# Patient Record
Sex: FEMALE | Race: WHITE | NOT HISPANIC OR LATINO | URBAN - METROPOLITAN AREA
[De-identification: names, ages, dates, MRNs, and addresses within clinical notes are randomized per-mention and may not be internally consistent; named-entity substitution may affect disease eponyms.]

---

## 2018-09-03 ENCOUNTER — INPATIENT (INPATIENT)
Facility: HOSPITAL | Age: 73
LOS: 3 days | Discharge: NURSING, REHAB OR LONG-TERM CARE | DRG: 177 | End: 2018-09-07
Attending: INTERNAL MEDICINE | Admitting: INTERNAL MEDICINE
Payer: MEDICARE

## 2018-09-03 VITALS
HEIGHT: 66 IN | DIASTOLIC BLOOD PRESSURE: 107 MMHG | SYSTOLIC BLOOD PRESSURE: 148 MMHG | WEIGHT: 279.99 LBS | RESPIRATION RATE: 30 BRPM | HEART RATE: 91 BPM | OXYGEN SATURATION: 99 %

## 2018-09-03 DIAGNOSIS — R09.89 OTHER SPECIFIED SYMPTOMS AND SIGNS INVOLVING THE CIRCULATORY AND RESPIRATORY SYSTEMS: ICD-10-CM

## 2018-09-03 DIAGNOSIS — R06.03 ACUTE RESPIRATORY DISTRESS: ICD-10-CM

## 2018-09-03 DIAGNOSIS — J44.1 CHRONIC OBSTRUCTIVE PULMONARY DISEASE WITH (ACUTE) EXACERBATION: ICD-10-CM

## 2018-09-03 PROBLEM — Z00.00 ENCOUNTER FOR PREVENTIVE HEALTH EXAMINATION: Status: ACTIVE | Noted: 2018-09-03

## 2018-09-03 LAB
ALBUMIN SERPL ELPH-MCNC: 3.6 G/DL — SIGNIFICANT CHANGE UP (ref 3.3–5)
ALP SERPL-CCNC: 123 U/L — HIGH (ref 40–120)
ALT FLD-CCNC: 22 U/L — SIGNIFICANT CHANGE UP (ref 12–78)
ANION GAP SERPL CALC-SCNC: 5 MMOL/L — SIGNIFICANT CHANGE UP (ref 5–17)
APTT BLD: 24.9 SEC — LOW (ref 27.5–37.4)
AST SERPL-CCNC: 38 U/L — HIGH (ref 15–37)
BASE EXCESS BLDA CALC-SCNC: 11.2 MMOL/L — HIGH (ref -2–2)
BASE EXCESS BLDA CALC-SCNC: 9.3 MMOL/L — HIGH (ref -2–2)
BASOPHILS # BLD AUTO: 0 K/UL — SIGNIFICANT CHANGE UP (ref 0–0.2)
BASOPHILS NFR BLD AUTO: 0 % — SIGNIFICANT CHANGE UP (ref 0–2)
BILIRUB SERPL-MCNC: 1.1 MG/DL — SIGNIFICANT CHANGE UP (ref 0.2–1.2)
BLOOD GAS COMMENTS ARTERIAL: SIGNIFICANT CHANGE UP
BLOOD GAS COMMENTS ARTERIAL: SIGNIFICANT CHANGE UP
BUN SERPL-MCNC: 14 MG/DL — SIGNIFICANT CHANGE UP (ref 7–23)
CALCIUM SERPL-MCNC: 9.6 MG/DL — SIGNIFICANT CHANGE UP (ref 8.5–10.1)
CHLORIDE SERPL-SCNC: 94 MMOL/L — LOW (ref 96–108)
CK MB BLD-MCNC: 9 % — HIGH (ref 0–3.5)
CK MB CFR SERPL CALC: 9.3 NG/ML — HIGH (ref 0–3.6)
CK SERPL-CCNC: 103 U/L — SIGNIFICANT CHANGE UP (ref 26–192)
CO2 SERPL-SCNC: 37 MMOL/L — HIGH (ref 22–31)
CREAT SERPL-MCNC: 0.81 MG/DL — SIGNIFICANT CHANGE UP (ref 0.5–1.3)
DIGOXIN SERPL-MCNC: 0.7 NG/ML — LOW (ref 0.8–2)
EOSINOPHIL # BLD AUTO: 0 K/UL — SIGNIFICANT CHANGE UP (ref 0–0.5)
EOSINOPHIL NFR BLD AUTO: 0 % — SIGNIFICANT CHANGE UP (ref 0–6)
GLUCOSE SERPL-MCNC: 210 MG/DL — HIGH (ref 70–99)
HCO3 BLDA-SCNC: 31 MMOL/L — HIGH (ref 23–27)
HCO3 BLDA-SCNC: 34 MMOL/L — HIGH (ref 23–27)
HCT VFR BLD CALC: 42.1 % — SIGNIFICANT CHANGE UP (ref 34.5–45)
HGB BLD-MCNC: 14.1 G/DL — SIGNIFICANT CHANGE UP (ref 11.5–15.5)
HOROWITZ INDEX BLDA+IHG-RTO: 40 — SIGNIFICANT CHANGE UP
HOROWITZ INDEX BLDA+IHG-RTO: 45 — SIGNIFICANT CHANGE UP
INR BLD: 1.18 RATIO — HIGH (ref 0.88–1.16)
LACTATE SERPL-SCNC: 1.9 MMOL/L — SIGNIFICANT CHANGE UP (ref 0.7–2)
LYMPHOCYTES # BLD AUTO: 0.26 K/UL — LOW (ref 1–3.3)
LYMPHOCYTES # BLD AUTO: 3 % — LOW (ref 13–44)
MANUAL SMEAR VERIFICATION: SIGNIFICANT CHANGE UP
MCHC RBC-ENTMCNC: 30.7 PG — SIGNIFICANT CHANGE UP (ref 27–34)
MCHC RBC-ENTMCNC: 33.5 GM/DL — SIGNIFICANT CHANGE UP (ref 32–36)
MCV RBC AUTO: 91.5 FL — SIGNIFICANT CHANGE UP (ref 80–100)
MONOCYTES # BLD AUTO: 0.09 K/UL — SIGNIFICANT CHANGE UP (ref 0–0.9)
MONOCYTES NFR BLD AUTO: 1 % — LOW (ref 2–14)
NEUTROPHILS # BLD AUTO: 8.24 K/UL — HIGH (ref 1.8–7.4)
NEUTROPHILS NFR BLD AUTO: 95 % — HIGH (ref 43–77)
NEUTS BAND # BLD: 1 % — SIGNIFICANT CHANGE UP (ref 0–8)
NRBC # BLD: 0 — SIGNIFICANT CHANGE UP
NRBC # BLD: SIGNIFICANT CHANGE UP /100 WBCS (ref 0–0)
PCO2 BLDA: 58 MMHG — HIGH (ref 32–46)
PCO2 BLDA: 69 MMHG — HIGH (ref 32–46)
PH BLDA: 7.33 — LOW (ref 7.35–7.45)
PH BLDA: 7.41 — SIGNIFICANT CHANGE UP (ref 7.35–7.45)
PLAT MORPH BLD: NORMAL — SIGNIFICANT CHANGE UP
PLATELET # BLD AUTO: 165 K/UL — SIGNIFICANT CHANGE UP (ref 150–400)
PO2 BLDA: 101 MMHG — SIGNIFICANT CHANGE UP (ref 74–108)
PO2 BLDA: 112 MMHG — HIGH (ref 74–108)
POTASSIUM SERPL-MCNC: 5 MMOL/L — SIGNIFICANT CHANGE UP (ref 3.5–5.3)
POTASSIUM SERPL-SCNC: 5 MMOL/L — SIGNIFICANT CHANGE UP (ref 3.5–5.3)
PROT SERPL-MCNC: 7.8 G/DL — SIGNIFICANT CHANGE UP (ref 6–8.3)
PROTHROM AB SERPL-ACNC: 12.9 SEC — HIGH (ref 9.8–12.7)
RAPID RVP RESULT: SIGNIFICANT CHANGE UP
RBC # BLD: 4.6 M/UL — SIGNIFICANT CHANGE UP (ref 3.8–5.2)
RBC # FLD: 14 % — SIGNIFICANT CHANGE UP (ref 10.3–14.5)
RBC BLD AUTO: SIGNIFICANT CHANGE UP
SAO2 % BLDA: 97 % — HIGH (ref 92–96)
SAO2 % BLDA: 99 % — HIGH (ref 92–96)
SODIUM SERPL-SCNC: 136 MMOL/L — SIGNIFICANT CHANGE UP (ref 135–145)
TROPONIN I SERPL-MCNC: 1.46 NG/ML — HIGH (ref 0.01–0.04)
WBC # BLD: 8.58 K/UL — SIGNIFICANT CHANGE UP (ref 3.8–10.5)
WBC # FLD AUTO: 8.58 K/UL — SIGNIFICANT CHANGE UP (ref 3.8–10.5)

## 2018-09-03 PROCEDURE — 93010 ELECTROCARDIOGRAM REPORT: CPT

## 2018-09-03 PROCEDURE — 71045 X-RAY EXAM CHEST 1 VIEW: CPT | Mod: 26

## 2018-09-03 PROCEDURE — 99223 1ST HOSP IP/OBS HIGH 75: CPT | Mod: GC,AI

## 2018-09-03 PROCEDURE — 99291 CRITICAL CARE FIRST HOUR: CPT

## 2018-09-03 RX ORDER — MAGNESIUM SULFATE 500 MG/ML
2 VIAL (ML) INJECTION ONCE
Qty: 0 | Refills: 0 | Status: COMPLETED | OUTPATIENT
Start: 2018-09-03 | End: 2018-09-03

## 2018-09-03 RX ORDER — FUROSEMIDE 40 MG
20 TABLET ORAL ONCE
Qty: 0 | Refills: 0 | Status: COMPLETED | OUTPATIENT
Start: 2018-09-03 | End: 2018-09-03

## 2018-09-03 RX ORDER — ASPIRIN/CALCIUM CARB/MAGNESIUM 324 MG
300 TABLET ORAL ONCE
Qty: 0 | Refills: 0 | Status: COMPLETED | OUTPATIENT
Start: 2018-09-03 | End: 2018-09-03

## 2018-09-03 RX ORDER — ALBUTEROL 90 UG/1
2.5 AEROSOL, METERED ORAL ONCE
Qty: 0 | Refills: 0 | Status: COMPLETED | OUTPATIENT
Start: 2018-09-03 | End: 2018-09-03

## 2018-09-03 RX ORDER — SODIUM CHLORIDE 9 MG/ML
1000 INJECTION INTRAMUSCULAR; INTRAVENOUS; SUBCUTANEOUS
Qty: 0 | Refills: 0 | Status: DISCONTINUED | OUTPATIENT
Start: 2018-09-03 | End: 2018-09-03

## 2018-09-03 RX ORDER — CEFTRIAXONE 500 MG/1
0 INJECTION, POWDER, FOR SOLUTION INTRAMUSCULAR; INTRAVENOUS
Qty: 0 | Refills: 0 | COMMUNITY

## 2018-09-03 RX ORDER — CEFTRIAXONE 500 MG/1
1 INJECTION, POWDER, FOR SOLUTION INTRAMUSCULAR; INTRAVENOUS ONCE
Qty: 0 | Refills: 0 | Status: COMPLETED | OUTPATIENT
Start: 2018-09-03 | End: 2018-09-03

## 2018-09-03 RX ORDER — ENOXAPARIN SODIUM 100 MG/ML
130 INJECTION SUBCUTANEOUS
Qty: 0 | Refills: 0 | Status: DISCONTINUED | OUTPATIENT
Start: 2018-09-03 | End: 2018-09-06

## 2018-09-03 RX ORDER — IPRATROPIUM/ALBUTEROL SULFATE 18-103MCG
3 AEROSOL WITH ADAPTER (GRAM) INHALATION ONCE
Qty: 0 | Refills: 0 | Status: COMPLETED | OUTPATIENT
Start: 2018-09-03 | End: 2018-09-03

## 2018-09-03 RX ORDER — ALBUTEROL 90 UG/1
2.5 AEROSOL, METERED ORAL EVERY 6 HOURS
Qty: 0 | Refills: 0 | Status: DISCONTINUED | OUTPATIENT
Start: 2018-09-03 | End: 2018-09-07

## 2018-09-03 RX ORDER — IPRATROPIUM BROMIDE 0.2 MG/ML
500 SOLUTION, NON-ORAL INHALATION ONCE
Qty: 0 | Refills: 0 | Status: COMPLETED | OUTPATIENT
Start: 2018-09-03 | End: 2018-09-03

## 2018-09-03 RX ORDER — AZITHROMYCIN 500 MG/1
500 TABLET, FILM COATED ORAL ONCE
Qty: 0 | Refills: 0 | Status: COMPLETED | OUTPATIENT
Start: 2018-09-03 | End: 2018-09-03

## 2018-09-03 RX ORDER — FUROSEMIDE 40 MG
1 TABLET ORAL
Qty: 0 | Refills: 0 | COMMUNITY

## 2018-09-03 RX ORDER — ENOXAPARIN SODIUM 100 MG/ML
128 INJECTION SUBCUTANEOUS
Qty: 0 | Refills: 0 | Status: DISCONTINUED | OUTPATIENT
Start: 2018-09-03 | End: 2018-09-03

## 2018-09-03 RX ADMIN — Medication 125 MILLIGRAM(S): at 17:25

## 2018-09-03 RX ADMIN — Medication 50 GRAM(S): at 18:34

## 2018-09-03 RX ADMIN — SODIUM CHLORIDE 150 MILLILITER(S): 9 INJECTION INTRAMUSCULAR; INTRAVENOUS; SUBCUTANEOUS at 18:34

## 2018-09-03 RX ADMIN — ENOXAPARIN SODIUM 130 MILLIGRAM(S): 100 INJECTION SUBCUTANEOUS at 23:41

## 2018-09-03 RX ADMIN — CEFTRIAXONE 100 GRAM(S): 500 INJECTION, POWDER, FOR SOLUTION INTRAMUSCULAR; INTRAVENOUS at 18:35

## 2018-09-03 RX ADMIN — AZITHROMYCIN 255 MILLIGRAM(S): 500 TABLET, FILM COATED ORAL at 19:27

## 2018-09-03 RX ADMIN — Medication 500 MICROGRAM(S): at 17:19

## 2018-09-03 RX ADMIN — Medication 30 MILLIGRAM(S): at 23:43

## 2018-09-03 RX ADMIN — ALBUTEROL 2.5 MILLIGRAM(S): 90 AEROSOL, METERED ORAL at 20:45

## 2018-09-03 RX ADMIN — ALBUTEROL 2.5 MILLIGRAM(S): 90 AEROSOL, METERED ORAL at 18:41

## 2018-09-03 RX ADMIN — ALBUTEROL 2.5 MILLIGRAM(S): 90 AEROSOL, METERED ORAL at 17:19

## 2018-09-03 RX ADMIN — Medication 300 MILLIGRAM(S): at 21:07

## 2018-09-03 RX ADMIN — Medication 3 MILLILITER(S): at 17:19

## 2018-09-03 NOTE — H&P ADULT - PMH
Atrial fibrillation, unspecified type    COPD (chronic obstructive pulmonary disease)    Coronary artery disease    Depression    Essential hypertension    GERD (gastroesophageal reflux disease)    Hyperlipidemia    Hypokalemia

## 2018-09-03 NOTE — ED ADULT NURSE NOTE - CHIEF COMPLAINT QUOTE
pt from [facility] and sent for acute resp distress, low oxygen sat, arrived by ems with cpap in place, room air sat at facility 70%

## 2018-09-03 NOTE — CONSULT NOTE ADULT - SUBJECTIVE AND OBJECTIVE BOX
Date/Time Patient Seen:  		  Referring MD:   Data Reviewed	       Patient is a 73y old  Female who presents with a chief complaint of     Subjective/HPI    transferred from [facility]  for resp distress  pt with COPD CHF  pt is on BIPAP  in [facility] today treated with NIPPV Diuresis NEBS and Steroids  pt is on AC VKA for AF  pt was recently hospitalized at [facility]  AB and labs reviewed  CXR reviewed  pt is a poor historian at present  labs and imaging and notes from [facility] reviewed    er provider note reviewed    ED Provider Note [Charted Location: [facility] ED] [Authored: 03-Sep-2018 17:54]- for Visit: [id number], Complete, Revised, Signed in Full, General    HISTORY OF PRESENT ILLNESS:    High Risk Travel:  International Travel? No(1)    · Chief Complaint: The patient is a 73y Female complaining of abdominal pain.  · HPI Objective Statement: bibems from [facility] on bipap and here for sob x today c mild nonproductive cough.  no cp, fever.   pmd- G Krichmar.  no other complaint.  · Presenting Symptoms: COUGH, DIFFICULTY BREATHING  · Negative Findings: no fever, no headache  · Timing: worsening  · Duration: day(s)  4  · Severity: PAIN SCALE 0 OF 10.  · Context: unknown  · Recent Exposure To: none known  · Aggravated Factors: none  · Relieving Factors: none    PAST MEDICAL/SURGICAL/FAMILY/SOCIAL HISTORY:    Past Medical History:  COPD (chronic obstructive pulmonary disease)    Depression    GERD (gastroesophageal reflux disease)    Hyperlipidemia    Hypokalemia.     Past Surgical History:  No significant past surgical history.     Tobacco Usage:  · Tobacco Usage	Former smoker    ALLERGIES AND HOME MEDICATIONS:   Allergies:        Allergies:  	No Known Drug Allergies:   	shellfish: Food, Unknown       PAST MEDICAL & SURGICAL HISTORY:  Hypokalemia  Hyperlipidemia  GERD (gastroesophageal reflux disease)  Depression  COPD (chronic obstructive pulmonary disease)  No significant past surgical history        Medication list         MEDICATIONS  (STANDING):  ALBUTerol    0.083% 2.5 milliGRAM(s) Nebulizer every 6 hours  azithromycin  IVPB 500 milliGRAM(s) IV Intermittent once  furosemide   Injectable 20 milliGRAM(s) IV Push Once  methylPREDNISolone sodium succinate Injectable 30 milliGRAM(s) IV Push three times a day  sodium chloride 0.9%. 1000 milliLiter(s) (150 mL/Hr) IV Continuous <Continuous>    MEDICATIONS  (PRN):         Vitals log        ICU Vital Signs Last 24 Hrs  T(C): --  T(F): --  HR: 67 (03 Sep 2018 18:15) (64 - 91)  BP: 116/71 (03 Sep 2018 18:15) (116/71 - 148/107)  BP(mean): --  ABP: --  ABP(mean): --  RR: 30 (03 Sep 2018 18:15) (30 - 30)  SpO2: 97% (03 Sep 2018 18:15) (96% - 99%)           Input and Output:  I&O's Detail      Lab Data                        14.1   8.58  )-----------( 165      ( 03 Sep 2018 18:26 )             42.1     09-03    136  |  94<L>  |  14  ----------------------------<  210<H>  5.0   |  37<H>  |  0.81    Ca    9.6      03 Sep 2018 17:30    TPro  7.8  /  Alb  3.6  /  TBili  1.1  /  DBili  x   /  AST  38<H>  /  ALT  22  /  AlkPhos  123<H>  09-03    ABG - ( 03 Sep 2018 17:41 )  pH, Arterial: 7.33  pH, Blood: x     /  pCO2: 69    /  pO2: 101   / HCO3: 31    / Base Excess: 9.3   /  SaO2: 97              stopped smoking in 2015  used Marijuana recreationally  lives at home    has children  recent stay at [facility]          Review of Systems	  resp distress    Objective     Physical Examination    heart s1s2  lung dec BS  abd soft  obese  cn grossly int      Pertinent Lab findings & Imaging      Lester:  NO   Adequate UO     I&O's Detail           Discussed with:     Cultures:	        Radiology

## 2018-09-03 NOTE — ED ADULT NURSE NOTE - OBJECTIVE STATEMENT
Pt received in bed alert and oriented and resting in resp distress. Pt BIBA from [facility] and sent for acute resp distress, low oxygen sat, arrived by ems with cpap in place, room air sat at facility 70%. As per Md's orders Pt placed on BIPAP and as per Md's orders duo and IV meds given and tolerated well. Nursing care ongoing and safety maintained.

## 2018-09-03 NOTE — CONSULT NOTE ADULT - SUBJECTIVE AND OBJECTIVE BOX
Patient is a 73y old  Female from [facility] hx CAD/MI, CHF, copd-O2 dependent 2L, afib-on coumadin, HTN, chronic pain, and gerd who presents with a chief complaint of SOB. As per medical record from [facility], pt had gotten up this am to go to the bathroom and became acutely SOB.  Pt's O2S 89% and was placed on NRB. Pt reportedly c/o mid posterior back pain but no chest pain. Pt was treated w/ nebs, lasix 80mg POX1(on BID), and Solumedrol 125mg, Rocephin X1and placed thony CPAP.  In ED, pt remains on Bipap, hemodynamically stable.  Pt lethargic but responsive.    PAST MEDICAL & SURGICAL HISTORY:  Hypokalemia  Hyperlipidemia  GERD (gastroesophageal reflux disease)  Depression  COPD (chronic obstructive pulmonary disease)  No significant past surgical history      BRIEF HOSPITAL COURSE:    Review of Systems:  Difficult to obtain bc pt on Bipap                                                    ICU Vital Signs Last 24 Hrs  T(C): --  T(F): --  HR: 62 (03 Sep 2018 19:53) (62 - 91)  BP: 122/71 (03 Sep 2018 19:37) (116/71 - 148/107)  BP(mean): --  ABP: --  ABP(mean): --  RR: 18 (03 Sep 2018 19:37) (18 - 30)  SpO2: 98% (03 Sep 2018 19:53) (96% - 100%)    Physical Examination:    General: obese, On Bipap, resting, responsive     HEENT: normacephalic, pupils equal and reactive     PULM: decreased BS at the bases, no wheezes or rhoncis     CVS: s1s2 irreg irreg     ABD: soft +BS nt nd    EXT: no edema     SKIN: warm, pink, no cyanosis noted     Neuro:  seems lethargic, but easily awakens, follows commands, moves all extremities     ABG - ( 03 Sep 2018 17:41 )  pH, Arterial: 7.33  pH, Blood: x     /  pCO2: 69    /  pO2: 101   / HCO3: 31    / Base Excess: 9.3   /  SaO2: 97                    LABS:                        14.1   8.58  )-----------( 165      ( 03 Sep 2018 18:26 )             42.1     09-03    136  |  94<L>  |  14  ----------------------------<  210<H>  5.0   |  37<H>  |  0.81    Ca    9.6      03 Sep 2018 17:30    TPro  7.8  /  Alb  3.6  /  TBili  1.1  /  DBili  x   /  AST  38<H>  /  ALT  22  /  AlkPhos  123<H>  09-03          CAPILLARY BLOOD GLUCOSE          PT/INR - ( 03 Sep 2018 18:29 )   PT: 12.9 sec;   INR: 1.18 ratio         PTT - ( 03 Sep 2018 18:29 )  PTT:24.9 sec    CULTURES:      Medications:      ALBUTerol    0.083% 2.5 milliGRAM(s) Nebulizer every 6 hours              methylPREDNISolone sodium succinate Injectable 30 milliGRAM(s) IV Push three times a day    sodium chloride 0.9%. 1000 milliLiter(s) IV Continuous <Continuous>            RADIOLOGY/IMAGING/ECHO  CXR mild vascular congestion, trace b/l effusions.    Critical care point of care ultrasound:    Assessment/Plan:  73y old  Female from [facility] hx CAD/MI, CHF, copd-O2 dependent 2L, afib-on coumadin, HTN, chronic pain, and gerd adm w/ respiratory distress, CHF vs copd exacerbation.        Critical Care time:   (Reviewing data, imaging, discussing with multidisciplinary team, non inclusive of procedures, discussing goals of care with patient/family) Patient is a 73y old  Female from [facility] hx CAD/MI, CHF, copd-O2 dependent 2L, afib-on coumadin, HTN, chronic pain, and gerd who presents with a chief complaint of SOB. As per medical record from [facility], pt had gotten up this am to go to the bathroom and became acutely SOB.  Pt's O2S 89% and was placed on NRB. Pt reportedly c/o mid posterior back pain but no chest pain. Pt was treated w/ nebs, lasix 80mg POX1(on BID), and Solumedrol 125mg, Rocephin X1and placed thony CPAP.  In ED, pt remains on Bipap, hemodynamically stable.  Pt lethargic but responsive.    PAST MEDICAL & SURGICAL HISTORY:  Hypokalemia  Hyperlipidemia  GERD (gastroesophageal reflux disease)  Depression  COPD (chronic obstructive pulmonary disease)  No significant past surgical history      BRIEF HOSPITAL COURSE:    Review of Systems:  Difficult to obtain bc pt on Bipap                                                    ICU Vital Signs Last 24 Hrs  T(C): --  T(F): --  HR: 62 (03 Sep 2018 19:53) (62 - 91)  BP: 122/71 (03 Sep 2018 19:37) (116/71 - 148/107)  BP(mean): --  ABP: --  ABP(mean): --  RR: 18 (03 Sep 2018 19:37) (18 - 30)  SpO2: 98% (03 Sep 2018 19:53) (96% - 100%)    Physical Examination:    General: obese, On Bipap, resting, responsive     HEENT: normacephalic, pupils equal and reactive     PULM: decreased BS at the bases, no wheezes or rhoncis     CVS: s1s2 irreg irreg     ABD: soft +BS nt nd    EXT: no edema     SKIN: warm, pink, no cyanosis noted     Neuro:  seems lethargic, but easily awakens, follows commands, moves all extremities     ABG - ( 03 Sep 2018 17:41 )  pH, Arterial: 7.33  pH, Blood: x     /  pCO2: 69    /  pO2: 101   / HCO3: 31    / Base Excess: 9.3   /  SaO2: 97                    LABS:                        14.1   8.58  )-----------( 165      ( 03 Sep 2018 18:26 )             42.1     09-03    136  |  94<L>  |  14  ----------------------------<  210<H>  5.0   |  37<H>  |  0.81    Ca    9.6      03 Sep 2018 17:30    TPro  7.8  /  Alb  3.6  /  TBili  1.1  /  DBili  x   /  AST  38<H>  /  ALT  22  /  AlkPhos  123<H>  09-03          CAPILLARY BLOOD GLUCOSE          PT/INR - ( 03 Sep 2018 18:29 )   PT: 12.9 sec;   INR: 1.18 ratio         PTT - ( 03 Sep 2018 18:29 )  PTT:24.9 sec    CULTURES:      Medications:  digoxin 125 mcg (0.125 mg) oral tablet: Last Dose Taken:  , 1 tab(s) orally once a day  · 	DULoxetine 60 mg oral delayed release capsule: Last Dose Taken:  , 1 cap(s) orally once a day  · 	Vitamin D3 2000 intl units oral capsule: Last Dose Taken:  , 1 cap(s) orally once a day  · 	famotidine 20 mg oral tablet: Last Dose Taken:  , orally once a day  · 	furosemide 80 mg oral tablet: Last Dose Taken:  , 1 tab(s) orally once a day  · 	verapamil 120 mg oral tablet: Last Dose Taken:  , orally once a day  · 	multivitamin: Last Dose Taken:  , orally once a day  · 	potassium chloride 20 mEq oral granule, extended release: Last Dose Taken:  , orally once a day  · 	sotalol 120 mg oral tablet: Last Dose Taken:  , 1 tab(s) orally 2 times a day  · 	MiraLax oral powder for reconstitution: Last Dose Taken:  , orally once a day  · 	atorvastatin 80 mg oral tablet: Last Dose Taken:  , 1 tab(s) orally once a day (at bedtime)  · 	lisinopril 40 mg oral tablet: Last Dose Taken:  , 1 tab(s) orally once a day  · 	albuterol 2.5 mg/3 mL (0.083%) inhalation solution: Last Dose Taken:  , inhaled every 6 hours, As Needed  · 	Spiriva 18 mcg inhalation capsule: Last Dose Taken:  , 1 cap(s) inhaled once a day  · 	oxyCODONE 5 mg oral tablet: Last Dose Taken:  , 1 tab(s) orally every 6 hours, As Needed  · 	Advair Diskus 250 mcg-50 mcg inhalation powder: Last Dose Taken:  , 1 puff(s) inhaled 2 times a day  · 	Tylenol Extra Strength 500 mg oral tablet: Last Dose Taken:  , orally every 8 hours, As Needed  · 	Coumadin 6 mg oral tablet: Last Dose Taken:  , 1 tab(s) orally once a day  · 	DuoNeb 0.5 mg-2.5 mg/3 mL inhalation solution: Last Dose Taken:  , inhaled every 6 hours, As Needed  · 	SOLU-Medrol 40 mg injection: Last Dose Taken:  , injectable every 8 hours  · 	predniSONE 20 mg oral tablet: Last Dose Taken:  , 1 tab(s) orally once a day  · 	Rocephin 1 g injection: intravenous once a day  · 	Bacid (LAC) oral tablet: Last Dose Taken:  , 1  orally 2 times a day      ALBUTerol    0.083% 2.5 milliGRAM(s) Nebulizer every 6 hours              methylPREDNISolone sodium succinate Injectable 30 milliGRAM(s) IV Push three times a day    sodium chloride 0.9%. 1000 milliLiter(s) IV Continuous <Continuous>            RADIOLOGY/IMAGING/ECHO  CXR mild vascular congestion, trace b/l effusions.    Critical care point of care ultrasound:    Assessment/Plan:  73y old  Female from [facility] hx CAD/MI, CHF, copd-O2 dependent 2L, afib-on coumadin, HTN, chronic pain, and gerd adm w/ respiratory distress, CHF vs copd exacerbation.  Pt hemodynamically stable.  ABG improved.    -No indication for ICU  -Bipap prn  -cont nebs, IV steroids, Lasix   -observe off abx  -f/u CTA chest, elevated D-dimer   -keep o2S>92%  -will need full dose AC, INR subtherapeutic    Discussed w/ E-ICU and hospitalist and agrees with plan.        Critical Care time: 50min   (Reviewing data, imaging, discussing with multidisciplinary team, non inclusive of procedures)

## 2018-09-03 NOTE — ED ADULT NURSE NOTE - PMH
COPD (chronic obstructive pulmonary disease)    Depression    GERD (gastroesophageal reflux disease)    Hyperlipidemia    Hypokalemia

## 2018-09-03 NOTE — ED ADULT NURSE REASSESSMENT NOTE - NS ED NURSE REASSESS COMMENT FT1
unable to obtain required IV access for CT angio, Dr Francois called, will call ICU PA to place central line or obtain other access. Patient cannot go to CT at this time. Patient otherwise without complaints, no change in status, still alert and cooperative. Downgraded from ICU to tele as per Dr Francois.
patient with comfortable appearance, breathing unlabored, remains on bipap. Alert and oriented to person and place, follows commands and states she feels better when asked. ICU PA, Dr Fernando and Dr Francois to bedside to evaluate patient to determine of she is an ICU candidate. V/S stable at this time. Lasix held and IVF stopped as per Dr Hansen. Lungs clear at this time.

## 2018-09-03 NOTE — H&P ADULT - ASSESSMENT
73 yo f pmh afib, cad, htn, hld, cardiac ablation, cva, copd, mdd being admitted for respiratory distress 2/2 mixed COPD/CHF exacerbation  1. Respiratory distress.  Telemetry floor.  Continious pulse ox.  Continue BIPAP, Steroids, Nebulizers.  Pulmonary recs appreciated.   consult Cardio.  F/U CTA to evaluate for PE  2. HTN - held PO meds 2/2 bipap use.  Give IV meds when indicated.   3. HLD - held PO statins 2/2 bipap, continue once off of BIPAP  4. Atrial fib - held PO rate control meds, IV cardizem as needed.  INR is not therapuetic, will give full dose Lovenox BID.  Held PO coumadin 2/2 BIPAP  5. MDD - held depression med 2/2 BIPAP  6. DVT proph - on Full dose LOVENOX

## 2018-09-03 NOTE — H&P ADULT - HISTORY OF PRESENT ILLNESS
71 yo f pmh afib, cad, htn, hld, cardiac ablation, cva, copd, mdd is coming from [place] for respiratory distress.  At the facility, patient was being treated for possible COPD exacerbation/CHF exacerbation/lobar pneumonia.  She was given Duonebs, Solumedrol 125mg, rocephin, and lasix at the facility but she wasn't improving so she was bought in with CPAP to [place] [facility] to be evaluated.  Pt was put on BIPAP in the ED, evaluated by Pulmonary, recs appreciated.  ICU consulted, but pt was not a candidate.  ABG improved after an hour of BIPAP.  Pt was lethargic when interviewing so unable to get a proper history.   In the ED, pt's D-Dimer elevated, Troponin elevated, likely from demand ischemia, did not see any acute ST changes on EKG.

## 2018-09-03 NOTE — PHARMACY COMMUNICATION NOTE - COMMENTS
Per therapeutic interchange policy, the enoxaparin dose of 128mg two times a day was discontinued, rounded to nearest tenth and entered as 130mg two times a day.

## 2018-09-03 NOTE — ED PROVIDER NOTE - OBJECTIVE STATEMENT
bibems from [facility] on bipap and here for sob x 4 days. bibems from [facility] on bipap and here for sob x today c mild nonproductive cough.  no cp, fever.   pmd- G Ronniichmar.  no other complaint.

## 2018-09-03 NOTE — CONSULT NOTE ADULT - PROBLEM SELECTOR RECOMMENDATION 9
resp distress  pt with CHF CAD COPD and Morbid Obesity  pt likely has PETER and OHS  old records reviewed, was on lasix, coumadin and nebs and steroids in [facility]  at present, CXR, LABS and clinical exam suggest mixed CHF and COPD ex  I and O, serial labs, serial PE, daily weight, diuresis with caution  Albuterol round the clock and Systemic Steroids  cont NIPPV support, ABG reviewed, keep sat > 88 pct, pt likely has PETER OHS  doubt pt has PNA, biomarkers pending, got empiric ABX in ED, work up under way  prognosis guarded, pt with multiple comorbidities  hold off on narcotics for pain and or other reasons, pt is lethargic, PETER OHS suspected  will follow  ICU admission likely based on clinical exam and comorbidities

## 2018-09-03 NOTE — ED ADULT TRIAGE NOTE - CHIEF COMPLAINT QUOTE
pt from [facility] and sent for acute resp distress, low oxygen sat, arrived by ems with cpap in place, room air sat at facility 70%

## 2018-09-03 NOTE — ED PROVIDER NOTE - PROGRESS NOTE DETAILS
pt felt better. case tiffany Colunga and pending ICU eval from Cesar case tiffany Colunga, Alessandro and pending ICU eval from Cesar

## 2018-09-03 NOTE — ED ADULT NURSE NOTE - ED STAT RN HANDOFF DETAILS
Pt stable and resting in bed, and maintained on BIPAP at 12/6 45%. Pt denies any pain or discomfort as of this time. Pt admitted and handoff report giver to JUDIE Charles for continuum of care. No sign of distress noted.

## 2018-09-04 DIAGNOSIS — I48.91 UNSPECIFIED ATRIAL FIBRILLATION: ICD-10-CM

## 2018-09-04 DIAGNOSIS — J44.1 CHRONIC OBSTRUCTIVE PULMONARY DISEASE WITH (ACUTE) EXACERBATION: ICD-10-CM

## 2018-09-04 DIAGNOSIS — I25.10 ATHEROSCLEROTIC HEART DISEASE OF NATIVE CORONARY ARTERY WITHOUT ANGINA PECTORIS: ICD-10-CM

## 2018-09-04 DIAGNOSIS — I50.9 HEART FAILURE, UNSPECIFIED: ICD-10-CM

## 2018-09-04 DIAGNOSIS — J96.02 ACUTE RESPIRATORY FAILURE WITH HYPERCAPNIA: ICD-10-CM

## 2018-09-04 DIAGNOSIS — M54.5 LOW BACK PAIN: ICD-10-CM

## 2018-09-04 DIAGNOSIS — E66.01 MORBID (SEVERE) OBESITY DUE TO EXCESS CALORIES: ICD-10-CM

## 2018-09-04 DIAGNOSIS — I10 ESSENTIAL (PRIMARY) HYPERTENSION: ICD-10-CM

## 2018-09-04 DIAGNOSIS — Z71.89 OTHER SPECIFIED COUNSELING: ICD-10-CM

## 2018-09-04 LAB
ALBUMIN SERPL ELPH-MCNC: 3 G/DL — LOW (ref 3.3–5)
ALP SERPL-CCNC: 102 U/L — SIGNIFICANT CHANGE UP (ref 40–120)
ALT FLD-CCNC: 16 U/L — SIGNIFICANT CHANGE UP (ref 12–78)
ANION GAP SERPL CALC-SCNC: 6 MMOL/L — SIGNIFICANT CHANGE UP (ref 5–17)
AST SERPL-CCNC: 25 U/L — SIGNIFICANT CHANGE UP (ref 15–37)
BASE EXCESS BLDA CALC-SCNC: 10.6 MMOL/L — HIGH (ref -2–2)
BASE EXCESS BLDA CALC-SCNC: 11.7 MMOL/L — HIGH (ref -2–2)
BASOPHILS # BLD AUTO: 0.01 K/UL — SIGNIFICANT CHANGE UP (ref 0–0.2)
BASOPHILS NFR BLD AUTO: 0.1 % — SIGNIFICANT CHANGE UP (ref 0–2)
BILIRUB SERPL-MCNC: 0.7 MG/DL — SIGNIFICANT CHANGE UP (ref 0.2–1.2)
BLOOD GAS COMMENTS ARTERIAL: SIGNIFICANT CHANGE UP
BLOOD GAS COMMENTS ARTERIAL: SIGNIFICANT CHANGE UP
BUN SERPL-MCNC: 19 MG/DL — SIGNIFICANT CHANGE UP (ref 7–23)
CALCIUM SERPL-MCNC: 9.3 MG/DL — SIGNIFICANT CHANGE UP (ref 8.5–10.1)
CHLORIDE SERPL-SCNC: 94 MMOL/L — LOW (ref 96–108)
CK MB BLD-MCNC: 5.2 % — HIGH (ref 0–3.5)
CK MB BLD-MCNC: 8.4 % — HIGH (ref 0–3.5)
CK MB CFR SERPL CALC: 7.6 NG/ML — HIGH (ref 0–3.6)
CK MB CFR SERPL CALC: 9.6 NG/ML — HIGH (ref 0–3.6)
CK SERPL-CCNC: 186 U/L — SIGNIFICANT CHANGE UP (ref 26–192)
CK SERPL-CCNC: 90 U/L — SIGNIFICANT CHANGE UP (ref 26–192)
CO2 SERPL-SCNC: 38 MMOL/L — HIGH (ref 22–31)
CREAT SERPL-MCNC: 0.59 MG/DL — SIGNIFICANT CHANGE UP (ref 0.5–1.3)
CRP SERPL-MCNC: 0.96 MG/DL — HIGH (ref 0–0.4)
EOSINOPHIL # BLD AUTO: 0 K/UL — SIGNIFICANT CHANGE UP (ref 0–0.5)
EOSINOPHIL NFR BLD AUTO: 0 % — SIGNIFICANT CHANGE UP (ref 0–6)
GLUCOSE SERPL-MCNC: 137 MG/DL — HIGH (ref 70–99)
HCO3 BLDA-SCNC: 35 MMOL/L — HIGH (ref 23–27)
HCO3 BLDA-SCNC: 36 MMOL/L — HIGH (ref 23–27)
HCT VFR BLD CALC: 38.7 % — SIGNIFICANT CHANGE UP (ref 34.5–45)
HGB BLD-MCNC: 12.9 G/DL — SIGNIFICANT CHANGE UP (ref 11.5–15.5)
HOROWITZ INDEX BLDA+IHG-RTO: 32 — SIGNIFICANT CHANGE UP
HOROWITZ INDEX BLDA+IHG-RTO: 32 — SIGNIFICANT CHANGE UP
IMM GRANULOCYTES NFR BLD AUTO: 0.3 % — SIGNIFICANT CHANGE UP (ref 0–1.5)
INR BLD: 1.48 RATIO — HIGH (ref 0.88–1.16)
LYMPHOCYTES # BLD AUTO: 0.37 K/UL — LOW (ref 1–3.3)
LYMPHOCYTES # BLD AUTO: 3.8 % — LOW (ref 13–44)
MCHC RBC-ENTMCNC: 30.1 PG — SIGNIFICANT CHANGE UP (ref 27–34)
MCHC RBC-ENTMCNC: 33.3 GM/DL — SIGNIFICANT CHANGE UP (ref 32–36)
MCV RBC AUTO: 90.4 FL — SIGNIFICANT CHANGE UP (ref 80–100)
MONOCYTES # BLD AUTO: 0.24 K/UL — SIGNIFICANT CHANGE UP (ref 0–0.9)
MONOCYTES NFR BLD AUTO: 2.4 % — SIGNIFICANT CHANGE UP (ref 2–14)
MRSA PCR RESULT.: SIGNIFICANT CHANGE UP
NEUTROPHILS # BLD AUTO: 9.2 K/UL — HIGH (ref 1.8–7.4)
NEUTROPHILS NFR BLD AUTO: 93.4 % — HIGH (ref 43–77)
PCO2 BLDA: 25 MMHG — LOW (ref 32–46)
PCO2 BLDA: 45 MMHG — SIGNIFICANT CHANGE UP (ref 32–46)
PH BLDA: 7.51 — HIGH (ref 7.35–7.45)
PH BLDA: 7.71 — CRITICAL HIGH (ref 7.35–7.45)
PLATELET # BLD AUTO: 132 K/UL — LOW (ref 150–400)
PO2 BLDA: 180 MMHG — HIGH (ref 74–108)
PO2 BLDA: 76 MMHG — SIGNIFICANT CHANGE UP (ref 74–108)
POTASSIUM SERPL-MCNC: 3.8 MMOL/L — SIGNIFICANT CHANGE UP (ref 3.5–5.3)
POTASSIUM SERPL-SCNC: 3.8 MMOL/L — SIGNIFICANT CHANGE UP (ref 3.5–5.3)
PROT SERPL-MCNC: 6.4 G/DL — SIGNIFICANT CHANGE UP (ref 6–8.3)
PROTHROM AB SERPL-ACNC: 16.3 SEC — HIGH (ref 9.8–12.7)
RBC # BLD: 4.28 M/UL — SIGNIFICANT CHANGE UP (ref 3.8–5.2)
RBC # FLD: 13.9 % — SIGNIFICANT CHANGE UP (ref 10.3–14.5)
S AUREUS DNA NOSE QL NAA+PROBE: SIGNIFICANT CHANGE UP
SAO2 % BLDA: 100 % — HIGH (ref 92–96)
SAO2 % BLDA: 97 % — HIGH (ref 92–96)
SODIUM SERPL-SCNC: 138 MMOL/L — SIGNIFICANT CHANGE UP (ref 135–145)
T3 SERPL-MCNC: 84 NG/DL — SIGNIFICANT CHANGE UP (ref 80–200)
T4 AB SER-ACNC: 7.3 UG/DL — SIGNIFICANT CHANGE UP (ref 4.6–12)
TROPONIN I SERPL-MCNC: 1.31 NG/ML — HIGH (ref 0.01–0.04)
TROPONIN I SERPL-MCNC: 1.72 NG/ML — HIGH (ref 0.01–0.04)
WBC # BLD: 9.85 K/UL — SIGNIFICANT CHANGE UP (ref 3.8–10.5)
WBC # FLD AUTO: 9.85 K/UL — SIGNIFICANT CHANGE UP (ref 3.8–10.5)

## 2018-09-04 PROCEDURE — 71275 CT ANGIOGRAPHY CHEST: CPT | Mod: 26

## 2018-09-04 PROCEDURE — 99233 SBSQ HOSP IP/OBS HIGH 50: CPT

## 2018-09-04 PROCEDURE — 99497 ADVNCD CARE PLAN 30 MIN: CPT | Mod: 25

## 2018-09-04 PROCEDURE — 99223 1ST HOSP IP/OBS HIGH 75: CPT

## 2018-09-04 PROCEDURE — 93306 TTE W/DOPPLER COMPLETE: CPT | Mod: 26

## 2018-09-04 PROCEDURE — 93970 EXTREMITY STUDY: CPT | Mod: 26

## 2018-09-04 RX ORDER — VERAPAMIL HCL 240 MG
120 CAPSULE, EXTENDED RELEASE PELLETS 24 HR ORAL DAILY
Qty: 0 | Refills: 0 | Status: DISCONTINUED | OUTPATIENT
Start: 2018-09-04 | End: 2018-09-04

## 2018-09-04 RX ORDER — FAMOTIDINE 10 MG/ML
20 INJECTION INTRAVENOUS DAILY
Qty: 0 | Refills: 0 | Status: DISCONTINUED | OUTPATIENT
Start: 2018-09-04 | End: 2018-09-07

## 2018-09-04 RX ORDER — POTASSIUM CHLORIDE 20 MEQ
0 PACKET (EA) ORAL
Qty: 0 | Refills: 0 | COMMUNITY

## 2018-09-04 RX ORDER — LISINOPRIL 2.5 MG/1
40 TABLET ORAL DAILY
Qty: 0 | Refills: 0 | Status: DISCONTINUED | OUTPATIENT
Start: 2018-09-05 | End: 2018-09-07

## 2018-09-04 RX ORDER — BUDESONIDE AND FORMOTEROL FUMARATE DIHYDRATE 160; 4.5 UG/1; UG/1
2 AEROSOL RESPIRATORY (INHALATION)
Qty: 0 | Refills: 0 | Status: DISCONTINUED | OUTPATIENT
Start: 2018-09-04 | End: 2018-09-07

## 2018-09-04 RX ORDER — OXYCODONE HYDROCHLORIDE 5 MG/1
5 TABLET ORAL EVERY 6 HOURS
Qty: 0 | Refills: 0 | Status: DISCONTINUED | OUTPATIENT
Start: 2018-09-04 | End: 2018-09-07

## 2018-09-04 RX ORDER — FUROSEMIDE 40 MG
80 TABLET ORAL EVERY 12 HOURS
Qty: 0 | Refills: 0 | Status: DISCONTINUED | OUTPATIENT
Start: 2018-09-04 | End: 2018-09-07

## 2018-09-04 RX ORDER — CHOLECALCIFEROL (VITAMIN D3) 125 MCG
2000 CAPSULE ORAL DAILY
Qty: 0 | Refills: 0 | Status: DISCONTINUED | OUTPATIENT
Start: 2018-09-04 | End: 2018-09-07

## 2018-09-04 RX ORDER — LORATADINE 10 MG/1
10 TABLET ORAL DAILY
Qty: 0 | Refills: 0 | Status: DISCONTINUED | OUTPATIENT
Start: 2018-09-04 | End: 2018-09-07

## 2018-09-04 RX ORDER — WARFARIN SODIUM 2.5 MG/1
6 TABLET ORAL ONCE
Qty: 0 | Refills: 0 | Status: COMPLETED | OUTPATIENT
Start: 2018-09-04 | End: 2018-09-04

## 2018-09-04 RX ORDER — DIGOXIN 250 MCG
0.12 TABLET ORAL DAILY
Qty: 0 | Refills: 0 | Status: DISCONTINUED | OUTPATIENT
Start: 2018-09-04 | End: 2018-09-06

## 2018-09-04 RX ORDER — WARFARIN SODIUM 2.5 MG/1
7.5 TABLET ORAL ONCE
Qty: 0 | Refills: 0 | Status: DISCONTINUED | OUTPATIENT
Start: 2018-09-04 | End: 2018-09-04

## 2018-09-04 RX ORDER — ATORVASTATIN CALCIUM 80 MG/1
80 TABLET, FILM COATED ORAL AT BEDTIME
Qty: 0 | Refills: 0 | Status: DISCONTINUED | OUTPATIENT
Start: 2018-09-04 | End: 2018-09-07

## 2018-09-04 RX ORDER — DULOXETINE HYDROCHLORIDE 30 MG/1
60 CAPSULE, DELAYED RELEASE ORAL DAILY
Qty: 0 | Refills: 0 | Status: DISCONTINUED | OUTPATIENT
Start: 2018-09-04 | End: 2018-09-07

## 2018-09-04 RX ORDER — SOTALOL HCL 120 MG
120 TABLET ORAL EVERY 12 HOURS
Qty: 0 | Refills: 0 | Status: DISCONTINUED | OUTPATIENT
Start: 2018-09-04 | End: 2018-09-07

## 2018-09-04 RX ORDER — LACTOBACILLUS ACIDOPHILUS 100MM CELL
1 CAPSULE ORAL
Qty: 0 | Refills: 0 | COMMUNITY

## 2018-09-04 RX ORDER — MENTHOL AND METHYL SALICYLATE 10; 30 G/100G; G/100G
1 STICK TOPICAL
Qty: 0 | Refills: 0 | COMMUNITY

## 2018-09-04 RX ORDER — TIOTROPIUM BROMIDE 18 UG/1
1 CAPSULE ORAL; RESPIRATORY (INHALATION) DAILY
Qty: 0 | Refills: 0 | Status: DISCONTINUED | OUTPATIENT
Start: 2018-09-04 | End: 2018-09-07

## 2018-09-04 RX ORDER — VERAPAMIL HCL 240 MG
120 CAPSULE, EXTENDED RELEASE PELLETS 24 HR ORAL DAILY
Qty: 0 | Refills: 0 | Status: DISCONTINUED | OUTPATIENT
Start: 2018-09-04 | End: 2018-09-07

## 2018-09-04 RX ORDER — PIPERACILLIN AND TAZOBACTAM 4; .5 G/20ML; G/20ML
3.38 INJECTION, POWDER, LYOPHILIZED, FOR SOLUTION INTRAVENOUS EVERY 8 HOURS
Qty: 0 | Refills: 0 | Status: DISCONTINUED | OUTPATIENT
Start: 2018-09-04 | End: 2018-09-07

## 2018-09-04 RX ORDER — INFLUENZA VIRUS VACCINE 15; 15; 15; 15 UG/.5ML; UG/.5ML; UG/.5ML; UG/.5ML
0.5 SUSPENSION INTRAMUSCULAR ONCE
Qty: 0 | Refills: 0 | Status: COMPLETED | OUTPATIENT
Start: 2018-09-04 | End: 2018-09-07

## 2018-09-04 RX ORDER — ACETAMINOPHEN 500 MG
0 TABLET ORAL
Qty: 0 | Refills: 0 | COMMUNITY

## 2018-09-04 RX ADMIN — OXYCODONE HYDROCHLORIDE 5 MILLIGRAM(S): 5 TABLET ORAL at 20:04

## 2018-09-04 RX ADMIN — OXYCODONE HYDROCHLORIDE 5 MILLIGRAM(S): 5 TABLET ORAL at 13:27

## 2018-09-04 RX ADMIN — PIPERACILLIN AND TAZOBACTAM 25 GRAM(S): 4; .5 INJECTION, POWDER, LYOPHILIZED, FOR SOLUTION INTRAVENOUS at 13:12

## 2018-09-04 RX ADMIN — Medication 2000 UNIT(S): at 12:57

## 2018-09-04 RX ADMIN — LORATADINE 10 MILLIGRAM(S): 10 TABLET ORAL at 16:04

## 2018-09-04 RX ADMIN — ALBUTEROL 2.5 MILLIGRAM(S): 90 AEROSOL, METERED ORAL at 19:41

## 2018-09-04 RX ADMIN — ALBUTEROL 2.5 MILLIGRAM(S): 90 AEROSOL, METERED ORAL at 13:09

## 2018-09-04 RX ADMIN — Medication 30 MILLIGRAM(S): at 12:57

## 2018-09-04 RX ADMIN — PIPERACILLIN AND TAZOBACTAM 25 GRAM(S): 4; .5 INJECTION, POWDER, LYOPHILIZED, FOR SOLUTION INTRAVENOUS at 22:19

## 2018-09-04 RX ADMIN — FAMOTIDINE 20 MILLIGRAM(S): 10 INJECTION INTRAVENOUS at 12:57

## 2018-09-04 RX ADMIN — OXYCODONE HYDROCHLORIDE 5 MILLIGRAM(S): 5 TABLET ORAL at 19:34

## 2018-09-04 RX ADMIN — WARFARIN SODIUM 6 MILLIGRAM(S): 2.5 TABLET ORAL at 22:20

## 2018-09-04 RX ADMIN — Medication 80 MILLIGRAM(S): at 12:57

## 2018-09-04 RX ADMIN — ALBUTEROL 2.5 MILLIGRAM(S): 90 AEROSOL, METERED ORAL at 01:20

## 2018-09-04 RX ADMIN — ATORVASTATIN CALCIUM 80 MILLIGRAM(S): 80 TABLET, FILM COATED ORAL at 22:19

## 2018-09-04 RX ADMIN — DULOXETINE HYDROCHLORIDE 60 MILLIGRAM(S): 30 CAPSULE, DELAYED RELEASE ORAL at 12:57

## 2018-09-04 RX ADMIN — ALBUTEROL 2.5 MILLIGRAM(S): 90 AEROSOL, METERED ORAL at 07:29

## 2018-09-04 RX ADMIN — Medication 120 MILLIGRAM(S): at 17:38

## 2018-09-04 RX ADMIN — ENOXAPARIN SODIUM 130 MILLIGRAM(S): 100 INJECTION SUBCUTANEOUS at 12:58

## 2018-09-04 RX ADMIN — Medication 30 MILLIGRAM(S): at 23:58

## 2018-09-04 RX ADMIN — OXYCODONE HYDROCHLORIDE 5 MILLIGRAM(S): 5 TABLET ORAL at 12:57

## 2018-09-04 NOTE — PROGRESS NOTE ADULT - ASSESSMENT
71 yo f pmh afib, cad, htn, hld, cva, copd, mdd being admitted for acute hypercarbic respiratory failure, likely in setting of acute COPD exacerbation on superimposed PETER/OHS

## 2018-09-04 NOTE — CONSULT NOTE ADULT - SUBJECTIVE AND OBJECTIVE BOX
[facility]         Annamaria Rodriguez, Gabriela, Keshia, Mrala, Bismark, Skyla        [phone] (office)    CHIEF COMPLAINT: Patient is a 73y old  Female who presents with a chief complaint of respiratory distress (03 Sep 2018 20:25)      HPI:  72F PMHx Afib (on coumadin, s/p ablation 2008), Medtronic pacemaker (implanted 2008, revised 2015), CAD (no h/o stents), HTN, HLD, ?CVA (~2001, attributed to alcoholism), COPD presenting from [facility] for respiratory distress.  Per patient was exposed to second hand smoke and since then felt SOB, got up to go to the bathroom and SOB acutely worsened, O2 sat 80%. Reports SOB improves when lying down.  Admits to diffuse chest heaviness when breathing, chronic LBP and B/L knee pain. Denies worsening of LE edema, chest pain, palpitations, calf tenderness.   At the facility, patient was being treated for possible COPD exacerbation/CHF exacerbation/lobar pneumonia, s/p PO Lasix 80, Duonebs, Solumedrol 125, Rocephin, placed on CPAP.   In the ED found to have respiratory acidosis, placed on BiPAP, seen by Pulmonology (Dr. Colunga), with elevated troponin and D-dimer. VQ scan and LE dopplers ordered, not yet performed.       PAST MEDICAL & SURGICAL HISTORY:  Coronary artery disease  Atrial fibrillation, unspecified type  Essential hypertension  Hypokalemia  Hyperlipidemia  GERD (gastroesophageal reflux disease)  Depression  COPD (chronic obstructive pulmonary disease)  No significant past surgical history      SOCIAL HISTORY: No active tobacco, alcohol or illicit drug use    FAMILY HISTORY:  No pertinent family history in first degree relatives    Outpatient medications:  Coumadin 6mg qd  Digoxin 0.125 qd  Lisinopril 40mg qd  Verapamil 120mg qd  Sotalol 120mg BID  Lasix 80mg BID  Atorvastatin 80 qd  Duloxetine 60  Famotidine 20  KCl 20  Albuterol  Spiriva  Oxycodone 5  Advair  Solumedrol   Prednisone 20mg      MEDICATIONS  (STANDING):  ALBUTerol    0.083% 2.5 milliGRAM(s) Nebulizer every 6 hours  enoxaparin Injectable 130 milliGRAM(s) SubCutaneous two times a day  methylPREDNISolone sodium succinate Injectable 30 milliGRAM(s) IV Push three times a day    MEDICATIONS  (PRN):      Allergies:  shellfish (Unknown)      REVIEW OF SYSTEMS: Is negative for eye, ENT, GI, , allergic, dermatologic, musculoskeletal and neurologic, except as described above.    VITAL SIGNS:   Vital Signs Last 24 Hrs  T(C): 36.3 (04 Sep 2018 06:14), Max: 37.1 (03 Sep 2018 21:54)  T(F): 97.4 (04 Sep 2018 06:14), Max: 98.8 (03 Sep 2018 21:54)  HR: 73 (04 Sep 2018 07:34) (62 - 97)  BP: 115/23 (04 Sep 2018 06:14) (107/70 - 148/107)  RR: 18 (04 Sep 2018 06:14) (18 - 30)  SpO2: 93% (04 Sep 2018 07:34) (93% - 100%)      PHYSICAL EXAM:  Constitutional: awake and alert, obese, responsive to questions, mild distress 2/2 breathing  Eyes:  EOMI, no oral cyanosis, conjunctivae clear, anicteric.  Pulmonary: Mild increased work of breathing, no use of accessory muscles to breath, breath sounds are distant 2/2 body habitus, no wheezing, rales or rhonchi  Cardiovascular:  irregularly irregular. No murmur.  No rubs, gallops or clicks  Gastrointestinal: Bowel Sounds present, soft, nontender.   Lymph: Trace non-pitting edema B/L.   Neurological: Alert, strength and sensitivity are grossly intact  Skin: No obvious lesions/rashes.   Psych:  Mood & affect appropriate .    LABS: All Labs Reviewed:                        14.1   8.58  )-----------( 165      ( 03 Sep 2018 18:26 )             42.1     03 Sep 2018 17:30    136    |  94     |  14     ----------------------------<  210    5.0     |  37     |  0.81     Ca    9.6        03 Sep 2018 17:30    TPro  7.8    /  Alb  3.6    /  TBili  1.1    /  DBili  x      /  AST  38     /  ALT  22     /  AlkPhos  123    03 Sep 2018 17:30    PT/INR - ( 03 Sep 2018 18:29 )   PT: 12.9 sec;   INR: 1.18 ratio         PTT - ( 03 Sep 2018 18:29 )  PTT:24.9 sec  CARDIAC MARKERS ( 04 Sep 2018 01:06 )  1.720 ng/mL / x     / 186 U/L / x     / 9.6 ng/mL  CARDIAC MARKERS ( 03 Sep 2018 19:29 )  1.460 ng/mL / x     / 103 U/L / x     / 9.3 ng/mL      Blood Culture:   09-03 @ 17:30  Pro Bnp [id number]    09-03 @ 19:29  TSH: 0.60      RADIOLOGY:  < from: Xray Chest 1 View AP/PA (09.03.18 @ 17:51) >  Heart not accurately evaluated on this projection. Calcified aortic arch.   Left cardiac pacer in situ. There is mild vascular congestion   interstitial edema. No focal infiltrate. Trace bilateral pleural   effusions. Thoracic dextroscoliosis.    EKG: HR 73, A-fib, inverted t-waves V1-V4 [facility]         Annamaria Rodriguez, Gabriela, Keshia, Marla, Bismark, Skyla        [phone] (office)    CHIEF COMPLAINT: Patient is a 73y old  Female who presents with a chief complaint of respiratory distress (03 Sep 2018 20:25)      HPI:  72F PMHx Afib (on coumadin, s/p ablation 2008), Medtronic pacemaker (implanted 2008, revised 2015), CAD (no h/o stents), HTN, HLD, ?CVA (~2001, attributed to alcoholism), COPD presenting from [facility] for respiratory distress.  Per patient was exposed to second hand smoke and since then felt SOB, got up to go to the bathroom and SOB acutely worsened, O2 sat 80%. Reports SOB improves when lying down.  Admits to diffuse chest heaviness when breathing, chronic LBP and B/L knee pain. Denies worsening of LE edema, chest pain, palpitations, calf tenderness.   At the facility, patient was being treated for possible COPD exacerbation/CHF exacerbation/lobar pneumonia, s/p Duonebs, Solumedrol 125, Rocephin, placed on CPAP.   In the ED found to have respiratory acidosis, placed on BiPAP, seen by Pulmonology (Dr. Colunga), with elevated troponin and D-dimer. CTA negative for PE, +RUL infiltrate, B/L pleural effusion R>L, LE dopplers ordered, not yet performed.       PAST MEDICAL & SURGICAL HISTORY:  Coronary artery disease  Atrial fibrillation, unspecified type  Essential hypertension  Hypokalemia  Hyperlipidemia  GERD (gastroesophageal reflux disease)  Depression  COPD (chronic obstructive pulmonary disease)  No significant past surgical history      SOCIAL HISTORY: No active tobacco, alcohol or illicit drug use    FAMILY HISTORY:  No pertinent family history in first degree relatives    Outpatient medications:  Coumadin 6mg qd  Digoxin 0.125 qd  Lisinopril 40mg qd  Verapamil 120mg qd  Sotalol 120mg BID  Lasix 80mg BID  Atorvastatin 80 qd  Duloxetine 60  Famotidine 20  KCl 20  Albuterol  Spiriva  Oxycodone 5  Advair  Solumedrol   Prednisone 20mg      MEDICATIONS  (STANDING):  ALBUTerol    0.083% 2.5 milliGRAM(s) Nebulizer every 6 hours  enoxaparin Injectable 130 milliGRAM(s) SubCutaneous two times a day  methylPREDNISolone sodium succinate Injectable 30 milliGRAM(s) IV Push three times a day    MEDICATIONS  (PRN):      Allergies:  shellfish (Unknown)      REVIEW OF SYSTEMS: Is negative for eye, ENT, GI, , allergic, dermatologic, musculoskeletal and neurologic, except as described above.    VITAL SIGNS:   Vital Signs Last 24 Hrs  T(C): 36.3 (04 Sep 2018 06:14), Max: 37.1 (03 Sep 2018 21:54)  T(F): 97.4 (04 Sep 2018 06:14), Max: 98.8 (03 Sep 2018 21:54)  HR: 73 (04 Sep 2018 07:34) (62 - 97)  BP: 115/23 (04 Sep 2018 06:14) (107/70 - 148/107)  RR: 18 (04 Sep 2018 06:14) (18 - 30)  SpO2: 93% (04 Sep 2018 07:34) (93% - 100%)      PHYSICAL EXAM:  Constitutional: awake and alert, obese, responsive to questions, mild distress 2/2 breathing  Eyes:  EOMI, no oral cyanosis, conjunctivae clear, anicteric.  Pulmonary: Mild increased work of breathing, no use of accessory muscles to breath, breath sounds are distant 2/2 body habitus, no wheezing, rales or rhonchi  Cardiovascular:  irregularly irregular. No murmur.  No rubs, gallops or clicks  Gastrointestinal: Bowel Sounds present, soft, nontender.   Lymph: Trace non-pitting edema B/L.   Neurological: Alert, strength and sensitivity are grossly intact  Skin: No obvious lesions/rashes.   Psych:  Mood & affect appropriate .    LABS: All Labs Reviewed:                        14.1   8.58  )-----------( 165      ( 03 Sep 2018 18:26 )             42.1     03 Sep 2018 17:30    136    |  94     |  14     ----------------------------<  210    5.0     |  37     |  0.81     Ca    9.6        03 Sep 2018 17:30    TPro  7.8    /  Alb  3.6    /  TBili  1.1    /  DBili  x      /  AST  38     /  ALT  22     /  AlkPhos  123    03 Sep 2018 17:30    PT/INR - ( 03 Sep 2018 18:29 )   PT: 12.9 sec;   INR: 1.18 ratio         PTT - ( 03 Sep 2018 18:29 )  PTT:24.9 sec  CARDIAC MARKERS ( 04 Sep 2018 01:06 )  1.720 ng/mL / x     / 186 U/L / x     / 9.6 ng/mL  CARDIAC MARKERS ( 03 Sep 2018 19:29 )  1.460 ng/mL / x     / 103 U/L / x     / 9.3 ng/mL      Blood Culture:   09-03 @ 17:30  Pro Bnp [id number]    09-03 @ 19:29  TSH: 0.60      RADIOLOGY:  < from: Xray Chest 1 View AP/PA (09.03.18 @ 17:51) >  Heart not accurately evaluated on this projection. Calcified aortic arch.   Left cardiac pacer in situ. There is mild vascular congestion   interstitial edema. No focal infiltrate. Trace bilateral pleural   effusions. Thoracic dextroscoliosis.    EKG: HR 73, A-fib, inverted t-waves V1-V4 [facility]         Annamaria Rodriguez, Gabriela, Keshia, Marla, Bismark, Skyla        [phone] (office)    CHIEF COMPLAINT: Patient is a 73y old  Female who presents with a chief complaint of respiratory distress (03 Sep 2018 20:25)      HPI:  72F PMHx Afib (on coumadin, s/p ablation 2008), Medtronic pacemaker (implanted 2008, revised 2015), CAD (no h/o stents), HTN, HLD, ?CVA (~2001, attributed to alcoholism), COPD presenting from [facility] for respiratory distress.  Per patient was exposed to second hand smoke and since then felt SOB, got up to go to the bathroom and SOB acutely worsened, O2 sat 80%. Reports SOB improves when lying down.  Admits to diffuse chest heaviness when breathing, chronic LBP and B/L knee pain. Denies worsening of LE edema, chest pain, palpitations, calf tenderness.   At the facility, patient was being treated for possible COPD exacerbation/CHF exacerbation/lobar pneumonia, s/p Duonebs, Solumedrol 125, Rocephin, placed on CPAP.   In the ED found to have respiratory acidosis, placed on BiPAP, seen by Pulmonology (Dr. Colunga), with elevated troponin and D-dimer. CTA negative for PE, +RUL infiltrate, B/L pleural effusion R>L, LE dopplers ordered, not yet performed.       PAST MEDICAL & SURGICAL HISTORY:  Coronary artery disease  Atrial fibrillation, unspecified type  Essential hypertension  Hypokalemia  Hyperlipidemia  GERD (gastroesophageal reflux disease)  Depression  COPD (chronic obstructive pulmonary disease)  No significant past surgical history      SOCIAL HISTORY: No active tobacco, alcohol or illicit drug use    FAMILY HISTORY:  No pertinent family history in first degree relatives    Outpatient medications:  Coumadin 6mg qd  Digoxin 0.125 qd  Lisinopril 40mg qd  Verapamil 120mg qd  Sotalol 120mg BID  Lasix 80mg BID  Atorvastatin 80 qd  Duloxetine 60  Famotidine 20  KCl 20   Albuterol  Spiriva  Oxycodone 5  Advair  Solumedrol   Prednisone 20mg      MEDICATIONS  (STANDING):  ALBUTerol    0.083% 2.5 milliGRAM(s) Nebulizer every 6 hours  enoxaparin Injectable 130 milliGRAM(s) SubCutaneous two times a day  methylPREDNISolone sodium succinate Injectable 30 milliGRAM(s) IV Push three times a day    MEDICATIONS  (PRN):      Allergies:  shellfish (Unknown)      REVIEW OF SYSTEMS: Is negative for eye, ENT, GI, , allergic, dermatologic, musculoskeletal and neurologic, except as described above.    VITAL SIGNS:   Vital Signs Last 24 Hrs  T(C): 36.3 (04 Sep 2018 06:14), Max: 37.1 (03 Sep 2018 21:54)  T(F): 97.4 (04 Sep 2018 06:14), Max: 98.8 (03 Sep 2018 21:54)  HR: 73 (04 Sep 2018 07:34) (62 - 97)  BP: 115/23 (04 Sep 2018 06:14) (107/70 - 148/107)  RR: 18 (04 Sep 2018 06:14) (18 - 30)  SpO2: 93% (04 Sep 2018 07:34) (93% - 100%)      PHYSICAL EXAM:  Constitutional: awake and alert, obese, responsive to questions, mild distress 2/2 breathing  Eyes:  EOMI, no oral cyanosis, conjunctivae clear, anicteric.  Pulmonary: Mild increased work of breathing, no use of accessory muscles to breath, breath sounds are distant 2/2 body habitus, no wheezing, rales or rhonchi  Cardiovascular:  irregularly irregular. No murmur.  No rubs, gallops or clicks  Gastrointestinal: Bowel Sounds present, soft, nontender.   Lymph: Trace non-pitting edema B/L.   Neurological: Alert, strength and sensitivity are grossly intact  Skin: No obvious lesions/rashes.   Psych:  Mood & affect appropriate .    LABS: All Labs Reviewed:                        14.1   8.58  )-----------( 165      ( 03 Sep 2018 18:26 )             42.1     03 Sep 2018 17:30    136    |  94     |  14     ----------------------------<  210    5.0     |  37     |  0.81     Ca    9.6        03 Sep 2018 17:30    TPro  7.8    /  Alb  3.6    /  TBili  1.1    /  DBili  x      /  AST  38     /  ALT  22     /  AlkPhos  123    03 Sep 2018 17:30    PT/INR - ( 03 Sep 2018 18:29 )   PT: 12.9 sec;   INR: 1.18 ratio         PTT - ( 03 Sep 2018 18:29 )  PTT:24.9 sec  CARDIAC MARKERS ( 04 Sep 2018 01:06 )  1.720 ng/mL / x     / 186 U/L / x     / 9.6 ng/mL  CARDIAC MARKERS ( 03 Sep 2018 19:29 )  1.460 ng/mL / x     / 103 U/L / x     / 9.3 ng/mL      Blood Culture:   09-03 @ 17:30  Pro Bnp [id number]    09-03 @ 19:29  TSH: 0.60      RADIOLOGY:  < from: Xray Chest 1 View AP/PA (09.03.18 @ 17:51) >  Heart not accurately evaluated on this projection. Calcified aortic arch.   Left cardiac pacer in situ. There is mild vascular congestion   interstitial edema. No focal infiltrate. Trace bilateral pleural   effusions. Thoracic dextroscoliosis.    EKG: HR 73, A-fib, inverted t-waves V1-V4 [facility]         Annamaria Rodriguez, Gabriela, Keshia, Marla, Bismark, Skyla        [phone] (office)    CHIEF COMPLAINT: Patient is a 73y old  Female who presents with a chief complaint of respiratory distress (03 Sep 2018 20:25)      HPI:  72F PMHx Afib (on coumadin, s/p ablation 2008), Medtronic pacemaker (implanted 2008, revised 2015), CAD (no h/o stents), HTN, HLD, ?CVA (~2001, attributed to alcoholism), COPD presenting from [facility] for respiratory distress.  Per patient was exposed to second hand smoke and since then felt SOB, got up to go to the bathroom and SOB acutely worsened, O2 sat 80%. Reports SOB improves when lying down.  Admits to diffuse chest heaviness when breathing, chronic LBP and B/L knee pain. Denies worsening of LE edema, chest pain, palpitations, calf tenderness.   At the facility, patient was being treated for possible COPD exacerbation/CHF exacerbation/lobar pneumonia, s/p Duonebs, Solumedrol 125, Rocephin, placed on CPAP.   In the ED found to have respiratory acidosis, placed on BiPAP, seen by Pulmonology (Dr. Colunga), with elevated troponin and D-dimer. CTA negative for PE, +RUL infiltrate, B/L pleural effusion R>L, LE dopplers ordered, not yet performed.       PAST MEDICAL & SURGICAL HISTORY:  Coronary artery disease  Atrial fibrillation, unspecified type  Essential hypertension  Hypokalemia  Hyperlipidemia  GERD (gastroesophageal reflux disease)  Depression  COPD (chronic obstructive pulmonary disease)  No significant past surgical history      SOCIAL HISTORY: No active tobacco, alcohol or illicit drug use    FAMILY HISTORY:  No hx of early CAD or SCD reported    Outpatient medications:  Coumadin 6mg qd  Digoxin 0.125 qd  Lisinopril 40mg qd  Verapamil 120mg qd  Sotalol 120mg BID  Lasix 80mg BID  Atorvastatin 80 qd  Duloxetine 60  Famotidine 20  KCl 20   Albuterol  Spiriva  Oxycodone 5  Advair  Solumedrol   Prednisone 20mg      MEDICATIONS  (STANDING):  ALBUTerol    0.083% 2.5 milliGRAM(s) Nebulizer every 6 hours  enoxaparin Injectable 130 milliGRAM(s) SubCutaneous two times a day  methylPREDNISolone sodium succinate Injectable 30 milliGRAM(s) IV Push three times a day    MEDICATIONS  (PRN):      Allergies:  shellfish (Unknown)      REVIEW OF SYSTEMS: Is negative for eye, ENT, GI, , allergic, dermatologic, musculoskeletal and neurologic, except as described above.    VITAL SIGNS:   Vital Signs Last 24 Hrs  T(C): 36.3 (04 Sep 2018 06:14), Max: 37.1 (03 Sep 2018 21:54)  T(F): 97.4 (04 Sep 2018 06:14), Max: 98.8 (03 Sep 2018 21:54)  HR: 73 (04 Sep 2018 07:34) (62 - 97)  BP: 115/23 (04 Sep 2018 06:14) (107/70 - 148/107)  RR: 18 (04 Sep 2018 06:14) (18 - 30)  SpO2: 93% (04 Sep 2018 07:34) (93% - 100%)      PHYSICAL EXAM:  Constitutional: awake and alert, obese, responsive to questions, mild distress 2/2 breathing  Eyes:  EOMI, no oral cyanosis, conjunctivae clear, anicteric.  Pulmonary: Mild increased work of breathing, no use of accessory muscles to breath, breath sounds are distant 2/2 body habitus, no wheezing, rales or rhonchi  Cardiovascular:  irregularly irregular. No murmur.  No rubs, gallops or clicks  Gastrointestinal: Bowel Sounds present, soft, nontender.   Lymph: Trace non-pitting edema B/L.   Neurological: Alert, strength and sensitivity are grossly intact  Skin: No obvious lesions/rashes.   Psych:  Mood & affect appropriate .    LABS: All Labs Reviewed:                        14.1   8.58  )-----------( 165      ( 03 Sep 2018 18:26 )             42.1     03 Sep 2018 17:30    136    |  94     |  14     ----------------------------<  210    5.0     |  37     |  0.81     Ca    9.6        03 Sep 2018 17:30    TPro  7.8    /  Alb  3.6    /  TBili  1.1    /  DBili  x      /  AST  38     /  ALT  22     /  AlkPhos  123    03 Sep 2018 17:30    PT/INR - ( 03 Sep 2018 18:29 )   PT: 12.9 sec;   INR: 1.18 ratio         PTT - ( 03 Sep 2018 18:29 )  PTT:24.9 sec  CARDIAC MARKERS ( 04 Sep 2018 01:06 )  1.720 ng/mL / x     / 186 U/L / x     / 9.6 ng/mL  CARDIAC MARKERS ( 03 Sep 2018 19:29 )  1.460 ng/mL / x     / 103 U/L / x     / 9.3 ng/mL      Blood Culture:   09-03 @ 17:30  Pro Bnp [id number]    09-03 @ 19:29  TSH: 0.60      RADIOLOGY:  < from: Xray Chest 1 View AP/PA (09.03.18 @ 17:51) >  Heart not accurately evaluated on this projection. Calcified aortic arch.   Left cardiac pacer in situ. There is mild vascular congestion   interstitial edema. No focal infiltrate. Trace bilateral pleural   effusions. Thoracic dextroscoliosis.    EKG: HR 73, A-fib, inverted t-waves V1-V4

## 2018-09-04 NOTE — PROGRESS NOTE ADULT - PROBLEM SELECTOR PLAN 5
check 2d [facility]  pt with troponinemia- liekly demand ischemia  c/w lasix 80mg po bid- no signs of acute decompensated heart failure check 2d [facility]  pt with troponinemia- liekly demand ischemia  c/w lasix 80mg po bid- no signs of acute decompensated heart failure  venous dopplers LE w/o evidence of DVT

## 2018-09-04 NOTE — CONSULT NOTE ADULT - ATTENDING COMMENTS
I saw and examined the patient personally. Spoke with above provider regarding this case. I reviewed the above findings completely.  I agree with the above history, physical, and plan which I have edited where appropriate. I saw and examined the patient personally on 9/4/18. Spoke with above provider regarding this case. I reviewed the above findings completely.  I agree with the above history, physical, and plan which I have edited where appropriate.

## 2018-09-04 NOTE — PROGRESS NOTE ADULT - PROBLEM SELECTOR PLAN 3
c/w Sotalol,  verapamil and digoxin  on Coumadin as outpt- INR subtherapeutic- started On lovenox 130mg bid for bridging- will discuss with Cards re: NOAC c/w Sotalol,  verapamil and digoxin  on Coumadin as outpt- INR subtherapeutic- started On lovenox 130mg bid for bridging given remote h/o of ?CVA (pt states due to alcoholism) will discuss with Cards re: NOAC c/w Sotalol,  verapamil and digoxin  on Coumadin as outpt- INR subtherapeutic- started On lovenox 130mg bid for bridging given remote h/o of ?CVA (pt states due to alcoholism).  Given BMI >40, DOAC not a great option as it hasn't been studies- will resume Coumadin tonight

## 2018-09-04 NOTE — CONSULT NOTE ADULT - ASSESSMENT
72F PMHx Afib (on coumadin, s/p ablation 2008), Medtronic pacemaker (implanted 2008, revised 2015), CAD (no h/o stents), HTN, HLD, ?CVA (vs coma; ~2001, attributed to alcoholism), COPD presenting from [facility] for respiratory distress, found to have elevated troponin, d-dimer and proBNP.    Likely primary respiratory distress contributing to SOB, patient has elevated proBNP however clinically does not appear fluid overloaded.   Doubt ACS, elevated troponins likely 2/2 demand ischemia.     - Lasix.....  - Continue....  - No clear evidence of acute ischemia, trops mildly elevated x 2 likely due to demand, pattern not consistent with acute ischemia negative x 2.  Will f/u third set, pt asymptomatic  - No evidence of volume overload on clinical exam  -Discussed with RN at [facility], no recent changes to cardiac medications  - Will obtain prior EKG from Cardiologist Dr. Davila for comparison  - To obtain prior ECHO (per patient >1 year ago)  - ECHO ordered  - BP well controlled, if maintains off BiPAP continue home PO BP meds, monitor routine hemodynamics  - monitor and replete lytes, keep K>4, Mg>2  - Other cardiovascular workup will depend on clinical course.  - All other workup per primary team  - Will follow 72F PMHx Afib (on coumadin, s/p ablation 2008), Medtronic pacemaker (implanted 2008, revised 2015), CAD (no h/o stents), HTN, HLD, ?CVA (vs coma; ~2001, attributed to alcoholism), COPD presenting from [facility] for respiratory distress, found to have elevated troponin, d-dimer and proBNP.    Likely primary respiratory distress contributing to SOB, patient has elevated proBNP however clinically does not appear fluid overloaded.   Doubt ACS, elevated troponins likely 2/2 demand ischemia.     - Lasix.....  - Continue....  - No clear evidence of acute ischemia, trops mildly elevated x 2 likely due to demand, pattern not consistent with acute ischemia negative x 2.  Will f/u third set, pt asymptomatic  - No evidence of volume overload on clinical exam  -Discussed with RN at [facility], no recent changes to cardiac medications  - Will obtain prior EKG from Cardiologist Dr. Davila for comparison  - To obtain prior ECHO (per patient >1 year ago)  - ECHO ordered, f/u  -INR subtherapeutic, on full dose Lovenox while NPO on BiPAP  -Digoxin level subtherapeutic  -D-Dimer elevated, f/u VQ scan and LE Dopplers  - BP well controlled, if maintains off BiPAP continue home PO BP meds, monitor routine hemodynamics  - monitor and replete lytes, keep K>4, Mg>2  - Other cardiovascular workup will depend on clinical course.  - All other workup per primary team  - Will follow 72F PMHx Afib (on coumadin, s/p ablation 2008), Medtronic pacemaker (implanted 2008, revised 2015), CAD (no h/o stents), HTN, HLD, ?CVA (vs coma; ~2001, attributed to alcoholism), COPD presenting from [facility] for respiratory distress, found to have elevated troponin, d-dimer and proBNP.    Likely primary respiratory distress contributing to SOB, patient has elevated proBNP however clinically does not appear fluid overloaded.   Doubt ACS, elevated troponins likely 2/2 demand ischemia.     - Continue PO Lasix 80 BID (home dose)  - No clear evidence of acute ischemia, trops mildly elevated x3, trending down, likely due to demand, pattern not consistent with acute ischemia. Cannot rule out anterior ischemia due to t-wave inversions in anterior leads, obtain prior EKG, pt asymptomatic  - No evidence of volume overload on clinical exam, f/u ECHO  - A-fib rate controlled, BP well controlled, monitor routine hemodynamics, monitor on tele, Continue Verapamil, Sotalol, Atorvastatin, Lisinopril at home dose  -INR subtherapeutic, continue full dose Lovenox bridge to Coumadin as patient has questionable h/o CVA, CHADsVASC>3  -Discussed with RN at [facility], no recent changes to cardiac medications (including Lasix)  - Will obtain prior EKG from Cardiologist Dr. Davila for comparison  - To obtain prior ECHO (per patient >1 year ago)  -Digoxin level subtherapeutic, continue home dose 0.125  -D-Dimer elevated, CTA negative for PE, f/u LE dopplers  - monitor and replete lytes, keep K>4, Mg>2  - Other cardiovascular workup will depend on clinical course.  - All other workup per primary team  - Will follow 72F PMHx Afib (on coumadin, s/p ablation 2008), Medtronic pacemaker (implanted 2008, revised 2015), CAD (no h/o stents), HTN, HLD, ?CVA (vs coma; ~2001, attributed to alcoholism), COPD presenting from [facility] for respiratory distress, found to have elevated troponin, d-dimer and proBNP.    Likely primary respiratory distress contributing to SOB, patient has elevated proBNP however clinically does not appear fluid overloaded.   Doubt ACS, elevated troponins likely 2/2 demand ischemia.     - SOB likely from COPD exacerbation. Would cont with pulm recs.   - Bipap PRN.   - Appears compensated from HF POV. Continue PO Lasix 80 BID (home dose). Monitor volume status closely.   - No clear evidence of acute ischemia, trops mildly elevated x3, trending down, likely due to demand, pattern not consistent with acute ischemia\plaque rupture. Cannot rule out anterior ischemia due to t-wave inversions in anterior leads, obtain prior EKG, pt asymptomatic  - Check echo  - A-fib rate controlled, BP well controlled, monitor routine hemodynamics, monitor on tele, Continue Verapamil, Sotalol, Atorvastatin, Lisinopril at home dose  -INR subtherapeutic, continue full dose Lovenox bridge to Coumadin as patient has questionable h/o CVA, CHADsVASC>3  -Discussed with RN at [facility], no recent changes to cardiac medications (including Lasix)  - Will obtain prior EKG from Cardiologist Dr. Davila for comparison  - To obtain prior ECHO (per patient >1 year ago)  -Digoxin level subtherapeutic, continue home dose 0.125  -D-Dimer elevated, CTA negative for PE, f/u LE dopplers  - monitor and replete lytes, keep K>4, Mg>2  - Other cardiovascular workup will depend on clinical course.  - All other workup per primary team  - Will follow

## 2018-09-04 NOTE — PATIENT PROFILE ADULT. - DO YOU FOLLOW
Pt in PACU, strait cathed for 100cc and then instilled 300cc for trial void. In phase 2 pt up to bathroom to void, 525cc output from patient.   
not applicable

## 2018-09-05 LAB
ANION GAP SERPL CALC-SCNC: 6 MMOL/L — SIGNIFICANT CHANGE UP (ref 5–17)
BUN SERPL-MCNC: 22 MG/DL — SIGNIFICANT CHANGE UP (ref 7–23)
CALCIUM SERPL-MCNC: 9.3 MG/DL — SIGNIFICANT CHANGE UP (ref 8.5–10.1)
CHLORIDE SERPL-SCNC: 91 MMOL/L — LOW (ref 96–108)
CO2 SERPL-SCNC: 38 MMOL/L — HIGH (ref 22–31)
CREAT SERPL-MCNC: 0.78 MG/DL — SIGNIFICANT CHANGE UP (ref 0.5–1.3)
GLUCOSE SERPL-MCNC: 173 MG/DL — HIGH (ref 70–99)
HCT VFR BLD CALC: 37.8 % — SIGNIFICANT CHANGE UP (ref 34.5–45)
HGB BLD-MCNC: 12.5 G/DL — SIGNIFICANT CHANGE UP (ref 11.5–15.5)
INR BLD: 1.6 RATIO — HIGH (ref 0.88–1.16)
LEGIONELLA AG UR QL: NEGATIVE — SIGNIFICANT CHANGE UP
MCHC RBC-ENTMCNC: 30.4 PG — SIGNIFICANT CHANGE UP (ref 27–34)
MCHC RBC-ENTMCNC: 33.1 GM/DL — SIGNIFICANT CHANGE UP (ref 32–36)
MCV RBC AUTO: 92 FL — SIGNIFICANT CHANGE UP (ref 80–100)
NRBC # BLD: 0 /100 WBCS — SIGNIFICANT CHANGE UP (ref 0–0)
PLATELET # BLD AUTO: 140 K/UL — LOW (ref 150–400)
POTASSIUM SERPL-MCNC: 4.1 MMOL/L — SIGNIFICANT CHANGE UP (ref 3.5–5.3)
POTASSIUM SERPL-SCNC: 4.1 MMOL/L — SIGNIFICANT CHANGE UP (ref 3.5–5.3)
PROTHROM AB SERPL-ACNC: 17.6 SEC — HIGH (ref 9.8–12.7)
RBC # BLD: 4.11 M/UL — SIGNIFICANT CHANGE UP (ref 3.8–5.2)
RBC # FLD: 14.1 % — SIGNIFICANT CHANGE UP (ref 10.3–14.5)
SODIUM SERPL-SCNC: 135 MMOL/L — SIGNIFICANT CHANGE UP (ref 135–145)
WBC # BLD: 12.48 K/UL — HIGH (ref 3.8–10.5)
WBC # FLD AUTO: 12.48 K/UL — HIGH (ref 3.8–10.5)

## 2018-09-05 PROCEDURE — 99233 SBSQ HOSP IP/OBS HIGH 50: CPT

## 2018-09-05 RX ORDER — WARFARIN SODIUM 2.5 MG/1
6 TABLET ORAL ONCE
Qty: 0 | Refills: 0 | Status: COMPLETED | OUTPATIENT
Start: 2018-09-05 | End: 2018-09-05

## 2018-09-05 RX ADMIN — Medication 120 MILLIGRAM(S): at 05:55

## 2018-09-05 RX ADMIN — FAMOTIDINE 20 MILLIGRAM(S): 10 INJECTION INTRAVENOUS at 11:07

## 2018-09-05 RX ADMIN — ENOXAPARIN SODIUM 130 MILLIGRAM(S): 100 INJECTION SUBCUTANEOUS at 00:01

## 2018-09-05 RX ADMIN — DULOXETINE HYDROCHLORIDE 60 MILLIGRAM(S): 30 CAPSULE, DELAYED RELEASE ORAL at 11:07

## 2018-09-05 RX ADMIN — LORATADINE 10 MILLIGRAM(S): 10 TABLET ORAL at 11:07

## 2018-09-05 RX ADMIN — PIPERACILLIN AND TAZOBACTAM 25 GRAM(S): 4; .5 INJECTION, POWDER, LYOPHILIZED, FOR SOLUTION INTRAVENOUS at 14:43

## 2018-09-05 RX ADMIN — ALBUTEROL 2.5 MILLIGRAM(S): 90 AEROSOL, METERED ORAL at 19:15

## 2018-09-05 RX ADMIN — Medication 80 MILLIGRAM(S): at 05:55

## 2018-09-05 RX ADMIN — ENOXAPARIN SODIUM 130 MILLIGRAM(S): 100 INJECTION SUBCUTANEOUS at 06:00

## 2018-09-05 RX ADMIN — OXYCODONE HYDROCHLORIDE 5 MILLIGRAM(S): 5 TABLET ORAL at 18:37

## 2018-09-05 RX ADMIN — ENOXAPARIN SODIUM 130 MILLIGRAM(S): 100 INJECTION SUBCUTANEOUS at 18:38

## 2018-09-05 RX ADMIN — Medication 120 MILLIGRAM(S): at 05:53

## 2018-09-05 RX ADMIN — Medication 2000 UNIT(S): at 11:07

## 2018-09-05 RX ADMIN — OXYCODONE HYDROCHLORIDE 5 MILLIGRAM(S): 5 TABLET ORAL at 14:17

## 2018-09-05 RX ADMIN — Medication 0.12 MILLIGRAM(S): at 05:55

## 2018-09-05 RX ADMIN — Medication 80 MILLIGRAM(S): at 18:38

## 2018-09-05 RX ADMIN — ALBUTEROL 2.5 MILLIGRAM(S): 90 AEROSOL, METERED ORAL at 13:22

## 2018-09-05 RX ADMIN — LISINOPRIL 40 MILLIGRAM(S): 2.5 TABLET ORAL at 05:55

## 2018-09-05 RX ADMIN — TIOTROPIUM BROMIDE 1 CAPSULE(S): 18 CAPSULE ORAL; RESPIRATORY (INHALATION) at 07:12

## 2018-09-05 RX ADMIN — PIPERACILLIN AND TAZOBACTAM 25 GRAM(S): 4; .5 INJECTION, POWDER, LYOPHILIZED, FOR SOLUTION INTRAVENOUS at 06:00

## 2018-09-05 RX ADMIN — OXYCODONE HYDROCHLORIDE 5 MILLIGRAM(S): 5 TABLET ORAL at 11:07

## 2018-09-05 RX ADMIN — Medication 30 MILLIGRAM(S): at 18:36

## 2018-09-05 RX ADMIN — WARFARIN SODIUM 6 MILLIGRAM(S): 2.5 TABLET ORAL at 22:28

## 2018-09-05 RX ADMIN — ATORVASTATIN CALCIUM 80 MILLIGRAM(S): 80 TABLET, FILM COATED ORAL at 22:28

## 2018-09-05 RX ADMIN — ALBUTEROL 2.5 MILLIGRAM(S): 90 AEROSOL, METERED ORAL at 01:08

## 2018-09-05 RX ADMIN — BUDESONIDE AND FORMOTEROL FUMARATE DIHYDRATE 2 PUFF(S): 160; 4.5 AEROSOL RESPIRATORY (INHALATION) at 07:13

## 2018-09-05 RX ADMIN — Medication 120 MILLIGRAM(S): at 18:37

## 2018-09-05 RX ADMIN — BUDESONIDE AND FORMOTEROL FUMARATE DIHYDRATE 2 PUFF(S): 160; 4.5 AEROSOL RESPIRATORY (INHALATION) at 18:38

## 2018-09-05 RX ADMIN — ALBUTEROL 2.5 MILLIGRAM(S): 90 AEROSOL, METERED ORAL at 07:43

## 2018-09-05 RX ADMIN — PIPERACILLIN AND TAZOBACTAM 25 GRAM(S): 4; .5 INJECTION, POWDER, LYOPHILIZED, FOR SOLUTION INTRAVENOUS at 22:28

## 2018-09-05 NOTE — PROGRESS NOTE ADULT - PROBLEM SELECTOR PLAN 2
COPD  COPD ex  PE ruled out with CTA  poss lower resp tract infection, cont Zosyn, will need 7 day course of ABX  will taper steroids this am to 30 mg PO BID  pt is off BIPAP, would refrain from any more ABG, monitor sat  out of bed as tolerated  cont NEBS, Inhalers, will follow  keep sat > 88 pct  monitor vs and HD and Sat  CT chest reviewed with patient

## 2018-09-05 NOTE — PROGRESS NOTE ADULT - ATTENDING COMMENTS
Seen/examined. Agree with above.  Laying flat in bed. Can continue with po Lasix.  Echocardiogram with distal and apical hypokinesis, and EKG with ant t wave inversions  Will need to obtain old records  Will consider ischemic evaluation, once breathing status is improved. Seen/examined. Agree with above.  Laying flat in bed. Can continue with po Lasix.  Coumadin for goal INR 2-3  Echocardiogram with distal and apical hypokinesis, and EKG with ant t wave inversions  Will need to obtain old records  Will consider ischemic evaluation, once breathing status is improved. Seen/examined. Agree with above.  Laying flat in bed. Can continue with po Lasix.  Coumadin for goal INR 2-3  Echocardiogram with distal and apical hypokinesis, and EKG with ant t wave inversions  Will need to obtain old records  Will needs ischemic evaluation (RHC/LHC), once breathing status is improved.

## 2018-09-05 NOTE — PROGRESS NOTE ADULT - PROBLEM SELECTOR PLAN 3
c/w Sotalol,  verapamil and digoxin  on Coumadin as outpt- INR subtherapeutic- started On lovenox 130mg bid for bridging given remote h/o of ?CVA (pt states due to alcoholism).  Given BMI >40, DOAC not a great option as it hasn't been studies- c/w marybeth

## 2018-09-05 NOTE — PHYSICAL THERAPY INITIAL EVALUATION ADULT - ADDITIONAL COMMENTS
Pt was transitioning to LTC facility.  Pt states she was independent in transfers and bed to bathroom ambulation with device as needed but used wheelchair primarily. Pt required assistance for all ADLs and was on O2 prior to hospitalization

## 2018-09-05 NOTE — PROGRESS NOTE ADULT - ASSESSMENT
73 yo f pmh afib, cad, htn, hld, cva, copd, mdd admitted for acute hypercarbic respiratory failure, likely in setting of acute COPD exacerbation on superimposed PETER/OHS and gram negative pa PNA

## 2018-09-05 NOTE — PROGRESS NOTE ADULT - PROBLEM SELECTOR PLAN 9
spent 18 min discussing advanced care planning and resuscitative measures including intubation, chest compressions and defibrillations. Pt states she has filled out a MOLST before, but unable to tell me what she signed. Currently, she is alert and orient to person, place and time. She is able to verbalize understanding of what these measures and states she want them implemented if needed. Attempted to reach out to daughter to obtain MOLST but unable to.
spent 18 min discussing advanced care planning and resuscitative measures including intubation, chest compressions and defibrillations. Pt states she has filled out a MOLST before, but unable to tell me what she signed. Currently, she is alert and orient to person, place and time. She is able to verbalize understanding of what these measures and states she want them implemented if needed. Attempted to reach out to daughter to obtain MOLST but unable to.

## 2018-09-05 NOTE — PROGRESS NOTE ADULT - ASSESSMENT
72F PMHx Afib (on coumadin, s/p ablation 2008), Medtronic pacemaker (implanted 2008, revised 2015), CAD (no h/o stents), HTN, HLD, ?CVA (vs coma; ~2001, attributed to alcoholism), COPD presenting from [facility] for respiratory distress, found to have elevated troponin, d-dimer and proBNP.    Likely primary respiratory distress contributing to SOB, patient has elevated proBNP however clinically does not appear fluid overloaded.   Doubt ACS, elevated troponins likely 2/2 demand ischemia.     - SOB likely from COPD exacerbation. Would cont with pulm recs.   - Bipap PRN.    - Appears compensated from HF POV. Continue PO Lasix 80 BID (home dose). Monitor volume status closely.   - No clear evidence of acute ischemia, trops mildly elevated x3, trending down, likely due to demand, pattern not consistent with acute ischemia\plaque rupture. Cannot rule out anterior ischemia due to t-wave inversions in anterior leads, obtain prior EKG, pt asymptomatic  - echo  Distal inferior, distal inferolateral, distal anterior and distal anteroseptal walls, along with the apex appear hypokinetic and the basal areas appear hyperkinetic.  LVEF 40-45%.  Mod AS, mild LAE  - A-fib rate controlled, BP well controlled, monitor routine hemodynamics, monitor on tele, Continue Verapamil, Sotalol, Atorvastatin, Lisinopril at home dose  -INR subtherapeutic, continue full dose Lovenox bridge to Coumadin as patient has questionable h/o CVA, CHADsVASC>3 Goal INR 2-3  -Discussed with RN at [facility], no recent changes to cardiac medications (including Lasix)  - Will obtain prior EKG from Cardiologist Dr. Davila for comparison  - To obtain prior ECHO (per patient >1 year ago)  -Digoxin level subtherapeutic, continue home dose 0.125  -D-Dimer elevated, CTA negative for PE, LE dopplers negative for DVT.    - monitor and replete lytes, keep K>4, Mg>2  - Other cardiovascular workup will depend on clinical course.  - All other workup per primary team  - Will follow     Keri Parsons NP-C  Cardiology 72F PMHx Afib (on coumadin, s/p ablation 2008), Medtronic pacemaker (implanted 2008, revised 2015), CAD (no h/o stents), HTN, HLD, ?CVA (vs coma; ~2001, attributed to alcoholism), COPD presenting from [facility] for respiratory distress, found to have elevated troponin, d-dimer and proBNP.    Likely primary respiratory distress contributing to SOB, patient has elevated proBNP however clinically does not appear fluid overloaded.   Doubt ACS, elevated troponins likely 2/2 demand ischemia.     - SOB likely from COPD exacerbation. Would cont with pulm recs.   - Bipap PRN.    - Appears compensated from HF POV. Continue PO Lasix 80 BID (home dose). Monitor volume status closely.   - No clear evidence of acute ischemia, trops mildly elevated x3, trending down, likely due to demand, pattern not consistent with acute ischemia\plaque rupture. Cannot rule out anterior ischemia due to t-wave inversions in anterior leads, obtain prior EKG, pt asymptomatic  - echo  Distal inferior, distal inferolateral, distal anterior and distal anteroseptal walls, along with the apex appear hypokinetic and the basal areas appear hyperkinetic.  LVEF 40-45%.  Mod AS, mild LAE.  In comparison to her echo at [facility] 5/2018 she had LVEF 73% dilated LV with mild TR, mild LVH.    - Last NST 4/13/2017 revealed EF 66-69% with mild inferolateral wall ischemia over a small territory with normal wall motion/thickening.    - A-fib rate controlled, BP well controlled, monitor routine hemodynamics, monitor on tele, Continue Verapamil, Sotalol, Atorvastatin, Lisinopril at home dose  -INR subtherapeutic, continue full dose Lovenox bridge to Coumadin as patient has questionable h/o CVA, CHADsVASC>3 Goal INR 2-3  -Discussed with RN at [facility], no recent changes to cardiac medications (including Lasix)  - Will obtain prior EKG from Cardiologist Dr. Davila for comparison  - To obtain prior ECHO (per patient >1 year ago)  -Digoxin level subtherapeutic, continue home dose 0.125  -D-Dimer elevated, CTA negative for PE, LE dopplers negative for DVT.    - monitor and replete lytes, keep K>4, Mg>2  - Other cardiovascular workup will depend on clinical course.  - All other workup per primary team  - Will follow     ASHWINI Ravi  Cardiology

## 2018-09-05 NOTE — PHYSICAL THERAPY INITIAL EVALUATION ADULT - GENERAL OBSERVATIONS, REHAB EVAL
Pt was received supine in bed, + IV, + heart monitor, + O2, cooperative with physical therapy evaluation

## 2018-09-05 NOTE — PROGRESS NOTE ADULT - PROBLEM SELECTOR PLAN 5
F/U 2d ECho  pt with troponinemia- likely demand ischemia  c/w lasix 80mg po bid- no signs of acute decompensated heart failure  venous dopplers LE w/o evidence of DVT echo results reviewed with Cardiology- pt with noted reduced EF 45-50% compared to previous echo, howver technically difficult study due to body habitus. In addition, inf wall seems hypokinetic, so will likely need an ischemic eval once pulm status better optimized  pt with troponinemia- likely demand ischemia  c/w lasix 80mg po bid- no signs of acute decompensated heart failure  venous dopplers LE w/o evidence of DVT echo results reviewed with Cardiology- pt with noted reduced EF 45-50% compared to previous echo, however technically difficult study due to body habitus. In addition, distal inf wall as well as apex and anteroseptum seems hypokinetic, so will likely need an ischemic eval once pulm status better optimized  pt with troponinemia- likely demand ischemia  c/w lasix 80mg po bid- no signs of acute decompensated heart failure  venous dopplers LE w/o evidence of DVT

## 2018-09-05 NOTE — PHYSICAL THERAPY INITIAL EVALUATION ADULT - PERTINENT HX OF CURRENT PROBLEM, REHAB EVAL
71 yo f pmh afib, cad, htn, hld, cardiac ablation, cva, copd, mdd is coming from [place] for respiratory distress.  At the facility, patient was being treated for possible COPD exacerbation/CHF exacerbation/lobar pneumonia.  She was given Duonebs, Solumedrol 125mg, rocephin, and lasix at the facility but she wasn't improving so she was bought in with CPAP to [place] ED to be evaluated.

## 2018-09-06 ENCOUNTER — TRANSCRIPTION ENCOUNTER (OUTPATIENT)
Age: 73
End: 2018-09-06

## 2018-09-06 LAB
ANION GAP SERPL CALC-SCNC: 3 MMOL/L — LOW (ref 5–17)
BUN SERPL-MCNC: 20 MG/DL — SIGNIFICANT CHANGE UP (ref 7–23)
CALCIUM SERPL-MCNC: 8.7 MG/DL — SIGNIFICANT CHANGE UP (ref 8.5–10.1)
CHLORIDE SERPL-SCNC: 93 MMOL/L — LOW (ref 96–108)
CO2 SERPL-SCNC: 42 MMOL/L — HIGH (ref 22–31)
CREAT SERPL-MCNC: 0.61 MG/DL — SIGNIFICANT CHANGE UP (ref 0.5–1.3)
GLUCOSE SERPL-MCNC: 114 MG/DL — HIGH (ref 70–99)
HCT VFR BLD CALC: 34.5 % — SIGNIFICANT CHANGE UP (ref 34.5–45)
HGB BLD-MCNC: 11 G/DL — LOW (ref 11.5–15.5)
INR BLD: 2.19 RATIO — HIGH (ref 0.88–1.16)
MCHC RBC-ENTMCNC: 29.6 PG — SIGNIFICANT CHANGE UP (ref 27–34)
MCHC RBC-ENTMCNC: 31.9 GM/DL — LOW (ref 32–36)
MCV RBC AUTO: 93 FL — SIGNIFICANT CHANGE UP (ref 80–100)
NRBC # BLD: 0 /100 WBCS — SIGNIFICANT CHANGE UP (ref 0–0)
PLATELET # BLD AUTO: 120 K/UL — LOW (ref 150–400)
POTASSIUM SERPL-MCNC: 4.3 MMOL/L — SIGNIFICANT CHANGE UP (ref 3.5–5.3)
POTASSIUM SERPL-SCNC: 4.3 MMOL/L — SIGNIFICANT CHANGE UP (ref 3.5–5.3)
PROTHROM AB SERPL-ACNC: 24.2 SEC — HIGH (ref 9.8–12.7)
RBC # BLD: 3.71 M/UL — LOW (ref 3.8–5.2)
RBC # FLD: 14.1 % — SIGNIFICANT CHANGE UP (ref 10.3–14.5)
SODIUM SERPL-SCNC: 138 MMOL/L — SIGNIFICANT CHANGE UP (ref 135–145)
WBC # BLD: 7.84 K/UL — SIGNIFICANT CHANGE UP (ref 3.8–10.5)
WBC # FLD AUTO: 7.84 K/UL — SIGNIFICANT CHANGE UP (ref 3.8–10.5)

## 2018-09-06 PROCEDURE — 99233 SBSQ HOSP IP/OBS HIGH 50: CPT | Mod: GC

## 2018-09-06 PROCEDURE — 99233 SBSQ HOSP IP/OBS HIGH 50: CPT

## 2018-09-06 RX ORDER — WARFARIN SODIUM 2.5 MG/1
5 TABLET ORAL ONCE
Qty: 0 | Refills: 0 | Status: COMPLETED | OUTPATIENT
Start: 2018-09-06 | End: 2018-09-06

## 2018-09-06 RX ORDER — SENNA PLUS 8.6 MG/1
2 TABLET ORAL AT BEDTIME
Qty: 0 | Refills: 0 | Status: DISCONTINUED | OUTPATIENT
Start: 2018-09-06 | End: 2018-09-07

## 2018-09-06 RX ORDER — DOCUSATE SODIUM 100 MG
100 CAPSULE ORAL
Qty: 0 | Refills: 0 | Status: DISCONTINUED | OUTPATIENT
Start: 2018-09-06 | End: 2018-09-07

## 2018-09-06 RX ADMIN — Medication 30 MILLIGRAM(S): at 05:59

## 2018-09-06 RX ADMIN — Medication 100 MILLIGRAM(S): at 17:56

## 2018-09-06 RX ADMIN — PIPERACILLIN AND TAZOBACTAM 25 GRAM(S): 4; .5 INJECTION, POWDER, LYOPHILIZED, FOR SOLUTION INTRAVENOUS at 22:16

## 2018-09-06 RX ADMIN — FAMOTIDINE 20 MILLIGRAM(S): 10 INJECTION INTRAVENOUS at 11:39

## 2018-09-06 RX ADMIN — Medication 120 MILLIGRAM(S): at 17:19

## 2018-09-06 RX ADMIN — BUDESONIDE AND FORMOTEROL FUMARATE DIHYDRATE 2 PUFF(S): 160; 4.5 AEROSOL RESPIRATORY (INHALATION) at 22:14

## 2018-09-06 RX ADMIN — Medication 30 MILLIGRAM(S): at 17:20

## 2018-09-06 RX ADMIN — BUDESONIDE AND FORMOTEROL FUMARATE DIHYDRATE 2 PUFF(S): 160; 4.5 AEROSOL RESPIRATORY (INHALATION) at 11:39

## 2018-09-06 RX ADMIN — Medication 80 MILLIGRAM(S): at 17:20

## 2018-09-06 RX ADMIN — OXYCODONE HYDROCHLORIDE 5 MILLIGRAM(S): 5 TABLET ORAL at 17:56

## 2018-09-06 RX ADMIN — DULOXETINE HYDROCHLORIDE 60 MILLIGRAM(S): 30 CAPSULE, DELAYED RELEASE ORAL at 11:39

## 2018-09-06 RX ADMIN — Medication 80 MILLIGRAM(S): at 05:52

## 2018-09-06 RX ADMIN — LORATADINE 10 MILLIGRAM(S): 10 TABLET ORAL at 11:39

## 2018-09-06 RX ADMIN — Medication 2000 UNIT(S): at 11:39

## 2018-09-06 RX ADMIN — ALBUTEROL 2.5 MILLIGRAM(S): 90 AEROSOL, METERED ORAL at 13:44

## 2018-09-06 RX ADMIN — ALBUTEROL 2.5 MILLIGRAM(S): 90 AEROSOL, METERED ORAL at 07:53

## 2018-09-06 RX ADMIN — OXYCODONE HYDROCHLORIDE 5 MILLIGRAM(S): 5 TABLET ORAL at 05:52

## 2018-09-06 RX ADMIN — OXYCODONE HYDROCHLORIDE 5 MILLIGRAM(S): 5 TABLET ORAL at 11:39

## 2018-09-06 RX ADMIN — OXYCODONE HYDROCHLORIDE 5 MILLIGRAM(S): 5 TABLET ORAL at 12:41

## 2018-09-06 RX ADMIN — OXYCODONE HYDROCHLORIDE 5 MILLIGRAM(S): 5 TABLET ORAL at 18:54

## 2018-09-06 RX ADMIN — TIOTROPIUM BROMIDE 1 CAPSULE(S): 18 CAPSULE ORAL; RESPIRATORY (INHALATION) at 05:54

## 2018-09-06 RX ADMIN — PIPERACILLIN AND TAZOBACTAM 25 GRAM(S): 4; .5 INJECTION, POWDER, LYOPHILIZED, FOR SOLUTION INTRAVENOUS at 13:20

## 2018-09-06 RX ADMIN — PIPERACILLIN AND TAZOBACTAM 25 GRAM(S): 4; .5 INJECTION, POWDER, LYOPHILIZED, FOR SOLUTION INTRAVENOUS at 05:54

## 2018-09-06 RX ADMIN — WARFARIN SODIUM 5 MILLIGRAM(S): 2.5 TABLET ORAL at 22:15

## 2018-09-06 RX ADMIN — LISINOPRIL 40 MILLIGRAM(S): 2.5 TABLET ORAL at 05:51

## 2018-09-06 RX ADMIN — ALBUTEROL 2.5 MILLIGRAM(S): 90 AEROSOL, METERED ORAL at 19:47

## 2018-09-06 RX ADMIN — Medication 120 MILLIGRAM(S): at 05:51

## 2018-09-06 RX ADMIN — ALBUTEROL 2.5 MILLIGRAM(S): 90 AEROSOL, METERED ORAL at 00:59

## 2018-09-06 RX ADMIN — ENOXAPARIN SODIUM 130 MILLIGRAM(S): 100 INJECTION SUBCUTANEOUS at 05:54

## 2018-09-06 RX ADMIN — Medication 120 MILLIGRAM(S): at 05:53

## 2018-09-06 RX ADMIN — ATORVASTATIN CALCIUM 80 MILLIGRAM(S): 80 TABLET, FILM COATED ORAL at 22:15

## 2018-09-06 RX ADMIN — SENNA PLUS 2 TABLET(S): 8.6 TABLET ORAL at 22:15

## 2018-09-06 NOTE — DISCHARGE NOTE ADULT - MEDICATION SUMMARY - MEDICATIONS TO TAKE
I will START or STAY ON the medications listed below when I get home from the hospital:    predniSONE 10 mg oral tablet  -- 3 tab(s) by mouth once a day, stop taking this medication on 9/11, then taper  -- Indication: For Acute respiratory failure with hypercapnia    oxyCODONE 5 mg oral tablet  -- 1 tab(s) by mouth every 6 hours, As needed, Severe Pain (7 - 10)  -- Indication: For Chronic low back pain    Tylenol Extra Strength 500 mg oral tablet  -- orally every 8 hours, As Needed  -- Indication: For Chronic low back pain    lisinopril 40 mg oral tablet  -- 1 tab(s) by mouth once a day  -- Indication: For HTN (hypertension)    verapamil 120 mg/12 hours oral tablet, extended release  -- 1 tab(s) by mouth once a day  -- Indication: For Essential hypertension    warfarin 5 mg oral tablet  -- 1 tab(s) by mouth once a day  -- Indication: For Atrial fibrillation, unspecified type    DULoxetine 60 mg oral delayed release capsule  -- 1 cap(s) by mouth once a day  -- Indication: For Depression    loratadine 10 mg oral tablet  -- 1 tab(s) by mouth once a day  -- Indication: For COPD exacerbation    atorvastatin 80 mg oral tablet  -- 1 tab(s) by mouth once a day (at bedtime)  -- Indication: For Hyperlipidemia    metoprolol tartrate 50 mg oral tablet  -- 1 tab(s) by mouth 2 times a day  -- Indication: For HTN (hypertension)    albuterol 2.5 mg/3 mL (0.083%) inhalation solution  -- 1 puff(s) inhaled every 6 hours  -- Indication: For COPD exacerbation    budesonide-formoterol 160 mcg-4.5 mcg/inh inhalation aerosol  -- 2 puff(s) inhaled 2 times a day  -- Indication: For COPD (chronic obstructive pulmonary disease)    tiotropium 18 mcg inhalation capsule  -- 1 cap(s) inhaled once a day  -- Indication: For COPD (chronic obstructive pulmonary disease)    Icy Hot  -- Apply on skin to affected area once a day  -- Indication: For Chronic low back pain    furosemide 80 mg oral tablet  -- 1 tab(s) by mouth every 12 hours  -- Indication: For Congestive heart failure, unspecified HF chronicity, unspecified heart failure type    famotidine 20 mg oral tablet  -- 1 tab(s) by mouth once a day  -- Indication: For GERD (gastroesophageal reflux disease)    docusate sodium 100 mg oral capsule  -- 1 cap(s) by mouth 2 times a day  -- Indication: For Constipation    senna oral tablet  -- 2 tab(s) by mouth once a day (at bedtime)  -- Indication: For Constipation    potassium chloride 20 mEq oral granule, extended release  -- orally once a day  -- Indication: For Congestive heart failure, unspecified HF chronicity, unspecified heart failure type    Augmentin 875 mg-125 mg oral tablet  -- 1 tab(s) by mouth every 12 hours. Take 2 pills for one day and stop.  -- Indication: For COPD exacerbation    Bacid (LAC) oral tablet  -- 1  by mouth 2 times a day  -- Indication: For prophylactic measure    multivitamin  -- orally once a day  -- Indication: For prophylactic measure    cholecalciferol oral tablet  -- 2000 unit(s) by mouth once a day  -- Indication: For prophylactic measure

## 2018-09-06 NOTE — PROGRESS NOTE ADULT - ASSESSMENT
72F PMHx Afib (on coumadin, s/p ablation 2008), Medtronic pacemaker (implanted 2008, revised 2015), CAD (no h/o stents), HTN, HLD, ?CVA (vs coma; ~2001, attributed to alcoholism), COPD presenting from [facility] for respiratory distress, found to have elevated troponin, d-dimer and proBNP.  Likely primary respiratory distress contributing to SOB, patient has elevated proBNP however clinically does not appear fluid overloaded.   Doubt ACS, elevated troponins likely due to demand ischemia.   SOB likely from COPD exacerbation  Echo  Distal inferior, distal inferolateral, distal anterior and distal anteroseptal walls, along with the apex appear hypokinetic and the basal areas appear hyperkinetic.  LVEF 40-45%.  Mod AS, mild LAE.  In comparison to her echo at [facility] 5/2018 she had LVEF 73% dilated LV with mild TR, mild LVH.    Dizziness may be vestibular but cannot r/o component of hypotension/vagal phsiology    Recommend  - Pulm f/u and recommendations for now  - Bipap PRN.    - cont warfarin INR 2-3  - Abx per med/pulm  - cont statin  - cont ACE I  - cont furosemide and keep I=O  - can d/c digoxin as no clear value  - D/C sotalol as pt remains in AF and has broken through  - cont verapamil for now  - No clear evidence of acute ischemia, trops mildly elevated x3, trending down, likely due to demand, pattern not consistent with acute ischemia\plaque rupture. Cannot rule out anterior ischemia due to t-wave inversions in anterior leads, obtain prior EKG, pt asymptomatic  -D-Dimer elevated, CTA negative for PE, LE dopplers negative for DVT.    - monitor and replete lytes, keep K>4, Mg>2  - Other cardiovascular workup will depend on clinical course.  - All other workup per primary team  - Will follow   - consider ischemic evaluation once stable

## 2018-09-06 NOTE — PROGRESS NOTE ADULT - ASSESSMENT
73 yo f pmh afib, cad, htn, hld, cva, copd, mdd admitted for acute hypercarbic respiratory failure, likely 2/2 acute COPD exacerbation on superimposed PETER/OHS with possible gram negative pa PNA

## 2018-09-06 NOTE — DISCHARGE NOTE ADULT - ADDITIONAL INSTRUCTIONS
Pt will follow with PMD at [facility] and also has cardio and Pul there. does not remember the names.  Assumes responsibility to make appointment. will get checked INR

## 2018-09-06 NOTE — DISCHARGE NOTE ADULT - MEDICATION SUMMARY - MEDICATIONS TO STOP TAKING
I will STOP taking the medications listed below when I get home from the hospital:    sotalol 120 mg oral tablet  -- 1 tab(s) by mouth 2 times a day    Coumadin 6 mg oral tablet  -- 1 tab(s) by mouth once a day    DuoNeb 0.5 mg-2.5 mg/3 mL inhalation solution  -- inhaled every 6 hours, As Needed    predniSONE 20 mg oral tablet  -- 1 tab(s) by mouth once a day    albuterol 2.5 mg/3 mL (0.083%) inhalation solution  -- 1 puff(s) inhaled every 6 hours    budesonide-formoterol 160 mcg-4.5 mcg/inh inhalation aerosol  -- 2 puff(s) inhaled 2 times a day    tiotropium 18 mcg inhalation capsule  -- 1 cap(s) inhaled once a day

## 2018-09-06 NOTE — DISCHARGE NOTE ADULT - PATIENT PORTAL LINK FT
You can access the [facility] Portal, offered by [facility], by registering with the following website: [email or web address]

## 2018-09-06 NOTE — DISCHARGE NOTE ADULT - SECONDARY DIAGNOSIS.
COPD (chronic obstructive pulmonary disease) Congestive heart failure, unspecified HF chronicity, unspecified heart failure type Atrial fibrillation, unspecified type Coronary artery disease Essential hypertension Hyperlipidemia

## 2018-09-06 NOTE — DISCHARGE NOTE ADULT - PLAN OF CARE
Resolution You came to the hospital for difficulty breathing. You were evaluated by pulmonary and cardiology doctors. Upon discharge you had improvement in breathing. Please follow up with your primary care doctor after leaving the hospital. Controlled You were started on albuterol, steroids, Spiriva, Symbicort antibiotics to control your trouble with breathing. Please continue these medication in addition to oxygen support. You were started on metoprolol. Please take this in addition to your home medications of lasix, lisinpril, verapamil. You were started on metoprolol. Please take this in addition to your home medication of coumadin. You were started on metoprolol. Please take this in addition to your home medications of lasix, lisinpril, verapamil, rosuvastatin Continue lasix, lisinopril, verapamil Continue rosuvastatin You were started on metoprolol. Please take this in addition to your home medications of lasix, lisinpril, verapamil. follow with Cardio You were started on metoprolol. Please take this in addition to your home medication of coumadin.   follow with Cardio monitor INR closely You were started on metoprolol. Please take this in addition to your home medications of lasix, lisinpril, verapamil, rosuvastatin.  follow with Cardio Continue lasix, lisinopril, verapamil.   follow with Cardio

## 2018-09-06 NOTE — PROGRESS NOTE ADULT - PROBLEM SELECTOR PLAN 3
c/w Sotalol,  verapamil and digoxin  on Coumadin as outpt, INR subtherapeutic on admit-->started On lovenox 130mg bid for bridging given remote h/o of ?CVA (pt states due to alcoholism).  Pt INR now therupatic at 2.19, will d/c loveonx and restart coumadin 5mg  Continue to monitor INR and PTT

## 2018-09-06 NOTE — PROGRESS NOTE ADULT - PROBLEM SELECTOR PLAN 2
on Prednisone; mild leukocytosis likely from steroid   advair/spiriva restarted  Pulm recs appreciated: will continue to taper prednisone as pt tolerates

## 2018-09-06 NOTE — DISCHARGE NOTE ADULT - HOSPITAL COURSE
71 yo f pmh Afib (on coumadin),COPD, CAD, HTN, HLD, cardiac ablation, ?CVA, MDD presented to the ED on 9/3 from [facility] for respiratory distress.  At the facility, patient was being treated for possible COPD vs CHF exacerbation/lobar pneumonia with Duonebs, Solumedrol 125mg, rocephin, and lasix at the facility without improvement. Pt was bought in with CPAP to [place] ED to be evaluated. Additional history was limited due to patient's lethgary. ED Vitals T 98 /107 HR 91 RR 30 SpO2 99%n on supplemental oxygen. Labs significant for h/h 14.1/.42.1, wbc 8.58, INR 1.8, BNP 6072, Trop 1.46, D-Dimer 3274, ABG: ph 7.33 pCO2 69 pO2 101. CXR showed mild vascular congestion infiltration with small trace b/l pleural effusion. Pt was placed on BIPAP, evaluated by Pulmonology ([facility]rist[facility]) who recommended albuterol, steriods    ABG improved after an hour of BIPAP, pt admitted for Acute hypercapnic respiratory failure 2/2 mixed COPD/CHF exacerbation in the setting of subtherapeutic INR and digoxin. 71 yo f pmh Afib (on coumadin),COPD, CAD, HTN, HLD, cardiac ablation, ?CVA, MDD presented to the ED on 9/3 from [facility] for respiratory distress.  At the facility, patient was being treated for possible COPD vs CHF exacerbation/lobar pneumonia with Duonebs, Solumedrol 125mg, rocephin, and lasix at the facility without improvement. Pt was bought in with CPAP to [place] ED to be evaluated. Additional history was limited due to patient's lethgary. ED Vitals T 98 /107 HR 91 RR 30 SpO2 99%n on supplemental oxygen. Labs significant for h/h 14.1/.42.1, wbc 8.58, INR 1.8, BNP 6072, Trop 1.46, D-Dimer 3274, ABG: ph 7.33 pCO2 69 pO2 101. CXR showed mild vascular congestion infiltration with small trace b/l pleural effusion. Pt was placed on BIPAP, evaluated by Pulmonology (Cristine) who recommended albuterol, steroids oxygen support. ABG improved after an hour of BIPAP, pt admitted for Acute hypercapnic respiratory failure 2/2 mixed COPD/CHF exacerbation in the setting of subtherapeutic INR and digoxin. CTA due to elevated D-dimer, negative for PE, showed likely RUL ground glass infiltrate. Bcx were drawn, pt started on empiric zosyn. MRSA, Legionella antigen, rapid RVP all negative. Cardio ([place]) evaluated pt, trop evaluation was likely due to dehydration 71 yo f pmh Afib (on coumadin),COPD, CAD, HTN, HLD, cardiac ablation, ?CVA, MDD presented to the ED on 9/3 from [facility] for respiratory distress.  At the facility, patient was being treated for possible COPD vs CHF exacerbation/lobar pneumonia with Duonebs, Solumedrol 125mg, rocephin, and lasix at the facility without improvement. Pt was bought in with CPAP to [place] ED to be evaluated. Additional history was limited due to patient's lethgary. ED Vitals T 98 /107 HR 91 RR 30 SpO2 99%n on supplemental oxygen. Labs significant for h/h 14.1/.42.1, wbc 8.58, INR 1.8, BNP 6072, Trop 1.46, D-Dimer 3274, ABG: ph 7.33 pCO2 69 pO2 101. CXR showed mild vascular congestion infiltration with small trace b/l pleural effusion. Pt was placed on BIPAP, evaluated by Pulmonology (Cristine) who recommended albuterol, Symbicort Spiriva steroids, oxygen support. ABG improved after an hour of BIPAP. Pt admitted for Acute hypercapnic respiratory failure 2/2 mixed COPD/CHF exacerbation in the setting of subtherapeutic INR and digoxin. CTA due to elevated D-dimer, negative for PE, showed likely RUL ground glass infiltrate. Bcx were drawn, pt started on empiric zosyn. MRSA, Legionella antigen, rapid RVP all negative. Cardio ([place]) evaluated pt, trop elevation X3 was likely due to demand ischemia as pattern not consistent with acute ischemia. Repeat Echo revealed a decrease in EF to 40-45% (73% from previous echo in 5/2018), with moderate AS mild LAE. Cardio recommended continuing Lasix for mild fluid overload and discontinued digoxin. Pt was loaded with Lovenox to bridge coumadin once INR at therapeutic levels. 72y F with hx of Afib (on coumadin, s/p ablation 2008), Medtronic pacemaker (2008, revised 2015), CAD, COPD, HTN, HLD,  ?CVA (vs coma; ~2001, attributed to alcoholism), depression presented to the ED on 9/3 from [facility] for respiratory distress.  At the facility, patient was being treated for possible COPD vs CHF exacerbation/lobar pneumonia with Duonebs, Solumedrol 125mg, rocephin, and lasix without improvement. Pt was bought in with CPAP to [place] ED to be evaluated. Additional history was limited due to patient's lethargy. ED Vitals T 98 /107 HR 91 RR 30 SpO2 99% on supplemental oxygen. Labs significant for h/h 14.1/.42.1, wbc 8.58, INR 1.8, BNP 6072, Trop 1.46, D-Dimer 3274, ABG: ph 7.33 pCO2 69 pO2 101. CXR showed mild vascular congestion infiltration with small trace b/l pleural effusion. Pt was placed on BIPAP, evaluated by Pulmonology (Cristine) who recommended albuterol, Symbicort Spiriva steroids, oxygen support. ABG improved after an hour of BIPAP. Pt was admitted for Acute hypercapnic respiratory failure 2/2 mixed COPD/CHF exacerbation with superimposed PETER. CTA was negative for PE, significant for likely RUL ground glass infiltrate. Bcx were drawn, pt started on empiric zosyn. MRSA, Legionella antigen, rapid RVP all negative. Bcx had no growth at time of discharge. Pt was instructed to use BIPAP at night, yet was noncompliant during her hospital stay. Cardio ([place]) evaluated pt as trop were elevated X3, believed to be due to demand ischemia. Repeat Echo 9/4/2018 revealed a decrease in EF to 40-45% (73% from previous echo in 5/2018), with moderate AS mild LAE. Cardio recommended continuing Lasix for mild fluid overload and discontinued digoxin, sotalol. Pt was started on metoprolol 50mg BID. Pt was loaded with Lovenox to bridge coumadin once INR at therapeutic levels. At discharge, Pt was restarted on coumadin.    Pt tolerated the above therapy and was examined on day of discharge. Pt has been medically optimized and is hemodynamically stable for discharge to [facility] with Augmentin for one day ( to complete 5 days of antibiotics) and prednisone taper.       Vital Signs Last 24 Hrs  T(C): 36.4 (09-07-18 @ 08:00), Max: 37.3 (09-06-18 @ 23:28)  T(F): 97.6 (09-07-18 @ 08:00), Max: 99.1 (09-06-18 @ 23:28)  HR: 71 (09-07-18 @ 08:00) (60 - 75)  BP: 127/84 (09-07-18 @ 08:00) (99/61 - 148/57)  BP(mean): --  RR: 17 (09-07-18 @ 08:00) (17 - 18)  SpO2: 97% (09-07-18 @ 08:00) (96% - 99%)    Physical Exam:  General: NAD, obese   HEENT: NCAT, EOMI bl, moist mucous membranes   Neck: Supple, nontender, no mass  Neurology: A&Ox3, nonfocal  Respiratory: diminished bs b/l  CV: RRR, +S1/S2, no murmurs  Abdominal: Soft, NT, ND +BSx4  Extremities: +pitting edema b/l, No C/C, + peripheral pulses  MSK: Normal ROM, no joint erythema or warmth, no joint swelling   Skin: warm, dry,  b/l upper extremity proximal antecubital ecchymosis

## 2018-09-06 NOTE — PROGRESS NOTE ADULT - PROBLEM SELECTOR PLAN 5
Cardiology- Echo: noted reduced EF 45-50% compared to previous echo. As per cardio reccs pt will need stress tests once stabilized.   pt with troponinemia- likely demand ischemia  c/w lasix 80mg po bid- no signs of acute decompensated heart failure, pt does not appear fluid overload at present

## 2018-09-06 NOTE — DISCHARGE NOTE ADULT - CARE PLAN
Principal Discharge DX:	Acute respiratory failure with hypercapnia  Goal:	Resolution  Assessment and plan of treatment:	You came to the hospital for difficulty breathing. You were evaluated by pulmonary and cardiology doctors. Upon discharge you had improvement in breathing. Please follow up with your primary care doctor after leaving the hospital.  Secondary Diagnosis:	COPD (chronic obstructive pulmonary disease)  Goal:	Controlled  Assessment and plan of treatment:	You were started on albuterol, steroids, Spiriva, Symbicort antibiotics to control your trouble with breathing. Please continue these medication in addition to oxygen support.  Secondary Diagnosis:	Congestive heart failure, unspecified HF chronicity, unspecified heart failure type  Goal:	Controlled  Assessment and plan of treatment:	You were started on metoprolol. Please take this in addition to your home medications of lasix, lisinpril, verapamil.  Secondary Diagnosis:	Atrial fibrillation, unspecified type  Goal:	Controlled  Assessment and plan of treatment:	You were started on metoprolol. Please take this in addition to your home medication of coumadin.  Secondary Diagnosis:	Coronary artery disease  Goal:	Controlled  Assessment and plan of treatment:	You were started on metoprolol. Please take this in addition to your home medications of lasix, lisinpril, verapamil, rosuvastatin  Secondary Diagnosis:	Essential hypertension  Goal:	Controlled  Assessment and plan of treatment:	Continue lasix, lisinopril, verapamil  Secondary Diagnosis:	Hyperlipidemia  Goal:	Controlled  Assessment and plan of treatment:	Continue rosuvastatin Principal Discharge DX:	Acute respiratory failure with hypercapnia  Goal:	Resolution  Assessment and plan of treatment:	You came to the hospital for difficulty breathing. You were evaluated by pulmonary and cardiology doctors. Upon discharge you had improvement in breathing. Please follow up with your primary care doctor after leaving the hospital.  Secondary Diagnosis:	COPD (chronic obstructive pulmonary disease)  Goal:	Controlled  Assessment and plan of treatment:	You were started on albuterol, steroids, Spiriva, Symbicort antibiotics to control your trouble with breathing. Please continue these medication in addition to oxygen support.  Secondary Diagnosis:	Congestive heart failure, unspecified HF chronicity, unspecified heart failure type  Goal:	Controlled  Assessment and plan of treatment:	You were started on metoprolol. Please take this in addition to your home medications of lasix, lisinpril, verapamil. follow with Cardio  Secondary Diagnosis:	Atrial fibrillation, unspecified type  Goal:	Controlled  Assessment and plan of treatment:	You were started on metoprolol. Please take this in addition to your home medication of coumadin.   follow with Cardio monitor INR closely  Secondary Diagnosis:	Coronary artery disease  Goal:	Controlled  Assessment and plan of treatment:	You were started on metoprolol. Please take this in addition to your home medications of lasix, lisinpril, verapamil, rosuvastatin.  follow with Cardio  Secondary Diagnosis:	Essential hypertension  Goal:	Controlled  Assessment and plan of treatment:	Continue lasix, lisinopril, verapamil.   follow with Cardio  Secondary Diagnosis:	Hyperlipidemia  Goal:	Controlled  Assessment and plan of treatment:	Continue rosuvastatin

## 2018-09-07 VITALS
OXYGEN SATURATION: 98 % | RESPIRATION RATE: 18 BRPM | DIASTOLIC BLOOD PRESSURE: 62 MMHG | TEMPERATURE: 98 F | HEART RATE: 66 BPM | SYSTOLIC BLOOD PRESSURE: 112 MMHG

## 2018-09-07 LAB
APTT BLD: 35.3 SEC — SIGNIFICANT CHANGE UP (ref 27.5–37.4)
BUN SERPL-MCNC: 19 MG/DL — SIGNIFICANT CHANGE UP (ref 7–23)
CALCIUM SERPL-MCNC: 8.8 MG/DL — SIGNIFICANT CHANGE UP (ref 8.5–10.1)
CHLORIDE SERPL-SCNC: 88 MMOL/L — LOW (ref 96–108)
CO2 SERPL-SCNC: 42 MMOL/L — HIGH (ref 22–31)
CREAT SERPL-MCNC: 0.61 MG/DL — SIGNIFICANT CHANGE UP (ref 0.5–1.3)
GLUCOSE SERPL-MCNC: 103 MG/DL — HIGH (ref 70–99)
HCT VFR BLD CALC: 36.1 % — SIGNIFICANT CHANGE UP (ref 34.5–45)
HGB BLD-MCNC: 12 G/DL — SIGNIFICANT CHANGE UP (ref 11.5–15.5)
INR BLD: 2.74 RATIO — HIGH (ref 0.88–1.16)
MCHC RBC-ENTMCNC: 30.4 PG — SIGNIFICANT CHANGE UP (ref 27–34)
MCHC RBC-ENTMCNC: 33.2 GM/DL — SIGNIFICANT CHANGE UP (ref 32–36)
MCV RBC AUTO: 91.4 FL — SIGNIFICANT CHANGE UP (ref 80–100)
NRBC # BLD: 0 /100 WBCS — SIGNIFICANT CHANGE UP (ref 0–0)
PLATELET # BLD AUTO: 132 K/UL — LOW (ref 150–400)
POTASSIUM SERPL-MCNC: 3.9 MMOL/L — SIGNIFICANT CHANGE UP (ref 3.5–5.3)
POTASSIUM SERPL-SCNC: 3.9 MMOL/L — SIGNIFICANT CHANGE UP (ref 3.5–5.3)
PROTHROM AB SERPL-ACNC: 30.5 SEC — HIGH (ref 9.8–12.7)
RBC # BLD: 3.95 M/UL — SIGNIFICANT CHANGE UP (ref 3.8–5.2)
RBC # FLD: 14 % — SIGNIFICANT CHANGE UP (ref 10.3–14.5)
SODIUM SERPL-SCNC: 134 MMOL/L — LOW (ref 135–145)
WBC # BLD: 7.33 K/UL — SIGNIFICANT CHANGE UP (ref 3.8–10.5)
WBC # FLD AUTO: 7.33 K/UL — SIGNIFICANT CHANGE UP (ref 3.8–10.5)

## 2018-09-07 PROCEDURE — 99232 SBSQ HOSP IP/OBS MODERATE 35: CPT

## 2018-09-07 PROCEDURE — 99239 HOSP IP/OBS DSCHRG MGMT >30: CPT

## 2018-09-07 RX ORDER — VERAPAMIL HCL 240 MG
1 CAPSULE, EXTENDED RELEASE PELLETS 24 HR ORAL
Qty: 0 | Refills: 0 | COMMUNITY
Start: 2018-09-07

## 2018-09-07 RX ORDER — LISINOPRIL 2.5 MG/1
1 TABLET ORAL
Qty: 0 | Refills: 0 | COMMUNITY
Start: 2018-09-07

## 2018-09-07 RX ORDER — IPRATROPIUM/ALBUTEROL SULFATE 18-103MCG
0 AEROSOL WITH ADAPTER (GRAM) INHALATION
Qty: 0 | Refills: 0 | COMMUNITY

## 2018-09-07 RX ORDER — WARFARIN SODIUM 2.5 MG/1
2.5 TABLET ORAL ONCE
Qty: 0 | Refills: 0 | Status: DISCONTINUED | OUTPATIENT
Start: 2018-09-07 | End: 2018-09-07

## 2018-09-07 RX ORDER — FUROSEMIDE 40 MG
1 TABLET ORAL
Qty: 0 | Refills: 0 | COMMUNITY
Start: 2018-09-07

## 2018-09-07 RX ORDER — VERAPAMIL HCL 240 MG
0 CAPSULE, EXTENDED RELEASE PELLETS 24 HR ORAL
Qty: 0 | Refills: 0 | COMMUNITY

## 2018-09-07 RX ORDER — WARFARIN SODIUM 2.5 MG/1
1 TABLET ORAL
Qty: 0 | Refills: 0 | COMMUNITY

## 2018-09-07 RX ORDER — OXYCODONE HYDROCHLORIDE 5 MG/1
1 TABLET ORAL
Qty: 0 | Refills: 0 | COMMUNITY
Start: 2018-09-07

## 2018-09-07 RX ORDER — ATORVASTATIN CALCIUM 80 MG/1
1 TABLET, FILM COATED ORAL
Qty: 0 | Refills: 0 | COMMUNITY
Start: 2018-09-07

## 2018-09-07 RX ORDER — POLYETHYLENE GLYCOL 3350 17 G/17G
0 POWDER, FOR SOLUTION ORAL
Qty: 0 | Refills: 0 | COMMUNITY

## 2018-09-07 RX ORDER — LISINOPRIL 2.5 MG/1
1 TABLET ORAL
Qty: 0 | Refills: 0 | COMMUNITY

## 2018-09-07 RX ORDER — FUROSEMIDE 40 MG
1 TABLET ORAL
Qty: 0 | Refills: 0 | COMMUNITY

## 2018-09-07 RX ORDER — CHOLECALCIFEROL (VITAMIN D3) 125 MCG
1 CAPSULE ORAL
Qty: 0 | Refills: 0 | COMMUNITY

## 2018-09-07 RX ORDER — ALBUTEROL 90 UG/1
0 AEROSOL, METERED ORAL
Qty: 0 | Refills: 0 | COMMUNITY

## 2018-09-07 RX ORDER — SENNA PLUS 8.6 MG/1
2 TABLET ORAL
Qty: 0 | Refills: 0 | COMMUNITY
Start: 2018-09-07

## 2018-09-07 RX ORDER — DIGOXIN 250 MCG
1 TABLET ORAL
Qty: 0 | Refills: 0 | COMMUNITY

## 2018-09-07 RX ORDER — TIOTROPIUM BROMIDE 18 UG/1
1 CAPSULE ORAL; RESPIRATORY (INHALATION)
Qty: 0 | Refills: 0 | COMMUNITY
Start: 2018-09-07

## 2018-09-07 RX ORDER — METOPROLOL TARTRATE 50 MG
50 TABLET ORAL
Qty: 0 | Refills: 0 | Status: DISCONTINUED | OUTPATIENT
Start: 2018-09-07 | End: 2018-09-07

## 2018-09-07 RX ORDER — CEFTRIAXONE 500 MG/1
1 INJECTION, POWDER, FOR SOLUTION INTRAMUSCULAR; INTRAVENOUS
Qty: 0 | Refills: 0 | COMMUNITY

## 2018-09-07 RX ORDER — OXYCODONE HYDROCHLORIDE 5 MG/1
1 TABLET ORAL
Qty: 0 | Refills: 0 | COMMUNITY

## 2018-09-07 RX ORDER — BUDESONIDE AND FORMOTEROL FUMARATE DIHYDRATE 160; 4.5 UG/1; UG/1
2 AEROSOL RESPIRATORY (INHALATION)
Qty: 0 | Refills: 0 | COMMUNITY
Start: 2018-09-07

## 2018-09-07 RX ORDER — FLUTICASONE PROPIONATE AND SALMETEROL 50; 250 UG/1; UG/1
1 POWDER ORAL; RESPIRATORY (INHALATION)
Qty: 0 | Refills: 0 | COMMUNITY

## 2018-09-07 RX ORDER — ATORVASTATIN CALCIUM 80 MG/1
1 TABLET, FILM COATED ORAL
Qty: 0 | Refills: 0 | COMMUNITY

## 2018-09-07 RX ORDER — CHOLECALCIFEROL (VITAMIN D3) 125 MCG
2000 CAPSULE ORAL
Qty: 0 | Refills: 0 | COMMUNITY
Start: 2018-09-07

## 2018-09-07 RX ORDER — DULOXETINE HYDROCHLORIDE 30 MG/1
1 CAPSULE, DELAYED RELEASE ORAL
Qty: 0 | Refills: 0 | COMMUNITY
Start: 2018-09-07

## 2018-09-07 RX ORDER — TIOTROPIUM BROMIDE 18 UG/1
1 CAPSULE ORAL; RESPIRATORY (INHALATION)
Qty: 0 | Refills: 0 | COMMUNITY

## 2018-09-07 RX ORDER — SOTALOL HCL 120 MG
1 TABLET ORAL
Qty: 0 | Refills: 0 | COMMUNITY

## 2018-09-07 RX ORDER — LORATADINE 10 MG/1
1 TABLET ORAL
Qty: 0 | Refills: 0 | COMMUNITY
Start: 2018-09-07

## 2018-09-07 RX ORDER — FAMOTIDINE 10 MG/ML
1 INJECTION INTRAVENOUS
Qty: 0 | Refills: 0 | COMMUNITY
Start: 2018-09-07

## 2018-09-07 RX ORDER — ALBUTEROL 90 UG/1
2.5 AEROSOL, METERED ORAL
Qty: 0 | Refills: 0 | COMMUNITY
Start: 2018-09-07

## 2018-09-07 RX ORDER — FAMOTIDINE 10 MG/ML
0 INJECTION INTRAVENOUS
Qty: 0 | Refills: 0 | COMMUNITY

## 2018-09-07 RX ORDER — METOPROLOL TARTRATE 50 MG
1 TABLET ORAL
Qty: 0 | Refills: 0 | COMMUNITY
Start: 2018-09-07

## 2018-09-07 RX ORDER — DOCUSATE SODIUM 100 MG
1 CAPSULE ORAL
Qty: 0 | Refills: 0 | COMMUNITY
Start: 2018-09-07

## 2018-09-07 RX ORDER — DULOXETINE HYDROCHLORIDE 30 MG/1
1 CAPSULE, DELAYED RELEASE ORAL
Qty: 0 | Refills: 0 | COMMUNITY

## 2018-09-07 RX ORDER — ALBUTEROL 90 UG/1
1 AEROSOL, METERED ORAL
Qty: 0 | Refills: 0 | COMMUNITY
Start: 2018-09-07

## 2018-09-07 RX ADMIN — Medication 2000 UNIT(S): at 11:51

## 2018-09-07 RX ADMIN — BUDESONIDE AND FORMOTEROL FUMARATE DIHYDRATE 2 PUFF(S): 160; 4.5 AEROSOL RESPIRATORY (INHALATION) at 08:42

## 2018-09-07 RX ADMIN — ALBUTEROL 2.5 MILLIGRAM(S): 90 AEROSOL, METERED ORAL at 07:06

## 2018-09-07 RX ADMIN — OXYCODONE HYDROCHLORIDE 5 MILLIGRAM(S): 5 TABLET ORAL at 11:51

## 2018-09-07 RX ADMIN — LISINOPRIL 40 MILLIGRAM(S): 2.5 TABLET ORAL at 05:39

## 2018-09-07 RX ADMIN — Medication 100 MILLIGRAM(S): at 05:38

## 2018-09-07 RX ADMIN — DULOXETINE HYDROCHLORIDE 60 MILLIGRAM(S): 30 CAPSULE, DELAYED RELEASE ORAL at 11:51

## 2018-09-07 RX ADMIN — LORATADINE 10 MILLIGRAM(S): 10 TABLET ORAL at 11:51

## 2018-09-07 RX ADMIN — FAMOTIDINE 20 MILLIGRAM(S): 10 INJECTION INTRAVENOUS at 11:51

## 2018-09-07 RX ADMIN — PIPERACILLIN AND TAZOBACTAM 25 GRAM(S): 4; .5 INJECTION, POWDER, LYOPHILIZED, FOR SOLUTION INTRAVENOUS at 05:41

## 2018-09-07 RX ADMIN — PIPERACILLIN AND TAZOBACTAM 25 GRAM(S): 4; .5 INJECTION, POWDER, LYOPHILIZED, FOR SOLUTION INTRAVENOUS at 13:43

## 2018-09-07 RX ADMIN — OXYCODONE HYDROCHLORIDE 5 MILLIGRAM(S): 5 TABLET ORAL at 05:36

## 2018-09-07 RX ADMIN — TIOTROPIUM BROMIDE 1 CAPSULE(S): 18 CAPSULE ORAL; RESPIRATORY (INHALATION) at 08:57

## 2018-09-07 RX ADMIN — ALBUTEROL 2.5 MILLIGRAM(S): 90 AEROSOL, METERED ORAL at 02:14

## 2018-09-07 RX ADMIN — ALBUTEROL 2.5 MILLIGRAM(S): 90 AEROSOL, METERED ORAL at 13:05

## 2018-09-07 RX ADMIN — Medication 120 MILLIGRAM(S): at 05:39

## 2018-09-07 RX ADMIN — Medication 30 MILLIGRAM(S): at 05:37

## 2018-09-07 RX ADMIN — Medication 120 MILLIGRAM(S): at 05:38

## 2018-09-07 RX ADMIN — INFLUENZA VIRUS VACCINE 0.5 MILLILITER(S): 15; 15; 15; 15 SUSPENSION INTRAMUSCULAR at 16:28

## 2018-09-07 RX ADMIN — OXYCODONE HYDROCHLORIDE 5 MILLIGRAM(S): 5 TABLET ORAL at 12:53

## 2018-09-07 RX ADMIN — Medication 80 MILLIGRAM(S): at 05:39

## 2018-09-07 NOTE — PROGRESS NOTE ADULT - PROVIDER SPECIALTY LIST ADULT
Cardiology
Cardiology
Hospitalist
Pulmonology
Pulmonology
Cardiology
Pulmonology
Pulmonology

## 2018-09-07 NOTE — PROGRESS NOTE ADULT - ASSESSMENT
72F PMHx Afib (on coumadin, s/p ablation 2008), Medtronic pacemaker (implanted 2008, revised 2015), CAD (no h/o stents), HTN, HLD, ?CVA (vs coma; ~2001, attributed to alcoholism), COPD presenting from [facility] for respiratory distress, found to have elevated troponin, d-dimer and proBNP. Likely primary respiratory distress contributing to SOB, patient has elevated proBNP however clinically does not appear fluid overloaded.   Doubt ACS, elevated troponins likely due to demand ischemia.      -there is no clear evidence of acute ischemia  -ce may represent demand ischemia  -known cad  -previously lv fxn was normal  -she reports having had a nuclear stress test with dr ga within the year.  -another ischemic evaluation may be required noting the drop in measured ef, though this can likely be elective.  would not be pushing for cath at this time  -cont statin    -af,    -rate is controlled overall  -will dc sotalol as she is in af  -would change to metoprolol 50 bid  -cont verapamil  -cont warfarin with goal inr 2-3    -no meaningful volume overload at this time  -cont ace  -on lasix, po; would continue  -small pedro pleural effusions on ct, no need for more aggressive measures at this time    - monitor and replete lytes, keep K>4, Mg>2  - Other cardiovascular workup will depend on clinical course.  - All other workup per primary team  - Will follow

## 2018-09-07 NOTE — PROGRESS NOTE ADULT - PROBLEM SELECTOR PROBLEM 3
Atrial fibrillation, unspecified type
Congestive heart failure, unspecified HF chronicity, unspecified heart failure type
Atrial fibrillation, unspecified type

## 2018-09-07 NOTE — PROGRESS NOTE ADULT - PROBLEM SELECTOR PLAN 1
copd, resp distress, chf, obesity, tiffani and ohs  pt is not using BIPAP night time, non compliant, discussed with daughter  will taper steroids this am to 30 mg PO daily for 4 days more total  would complete 5 days of ABX only, as pt is less likely to have infectious etiol.   chf - heart disease - cont diuresis, cvs regimen and BP control  I and O  replete lytes  increase activity  COPD regimen - symbicort, spiriva, albuterol, o2 support  will follow  dc planning to [facility] for further care, rehab and long term residence  prognosis guarded, pt with limited insight into her medical situation and poor compliance
HF  cad  on diuresis  I and O  BP control  replete lytes  cardio following  cont Tele monitoring  serial labs
resp distress  CHF COPD CAD  Obesity PETER OHS  eval for PE, D dimer high, CTA chest pending, LE dopplers pending  pt remains on BIPAP, am ABG noted, will assess off BIPAP and on 3 L nc o2 support  keep sat > 88 pct  monitor HD and VS  am labs pending  I and O is not documented  daily weight  may need diuresis  cardio eval pending  may benefit from ICU monitoring and care  prognosis remains guarded
resp distress, PETER, OHS, Obesity, COPD, CHF  I and O  diuresis as tolerated, cardio follow up, BP control, replete lytes  cont albuterol, Spiriva, Symbicort, o2 support  pt is not using BIPAP, education provided, importance of NIPPV stressed - PETER OHS COPD CHF  pt is notoriously non - compliant, discussed this with her daughter, prognosis guarded  daughter states that she plans for long term residence at [facility]  will follow  will cont current dose of Systemic Steroids, will taper slowly
Likely due to acute, severe COPD exacerbation with superimposed PETER/OHS  Pt not using BIPAP at night, only on NC 3L  Pulm reccs appreciated: c/w steroid taper Prednisone 30mg for 4 more days (9/11), one more day of ABX as pt is not infectious (no wbc, fever, purulent sputum) to complete 5 day tx
likely due to acute, severe COPD exacerbation with superimposed PETER/OHS  pco2 improved with BIPAP   CTA chest w/o evidence of PE- infiltrate noted- Zosyn started for gram negative pa PNA;  MRSA screen and urine legionella are negative
likely due to acute, severe COPD exacerbation with superimposed PETER/OHS  pco2 improved with BIPAP, now on NC 3L stating well  CTA chest infiltrate noted- Zosyn started for gram negative pa PNA;  MRSA screen and urine legionella are negative  f/u BCx as there is no growth to date
likely due to acute, severe COPD exacerbation with superimposed PETER  pco2 improved with BIPAP and transitioned to NC this morning- last ABG indicates pt with likely chronic hypercarbia (55-60 is likely nml for her)  CTA chest w/o evidence of PE- infiltrate noted- Zosyn started for gram negative pa PNA; f/u MRSA screen to guide if Vanco is needed; check urine legionella

## 2018-09-07 NOTE — PROGRESS NOTE ADULT - PROBLEM SELECTOR PROBLEM 1
Congestive heart failure, unspecified HF chronicity, unspecified heart failure type
Acute respiratory failure with hypercapnia
Respiratory distress
Acute respiratory failure with hypercapnia

## 2018-09-07 NOTE — PROGRESS NOTE ADULT - REASON FOR ADMISSION
respiratory distress

## 2018-09-07 NOTE — PROGRESS NOTE ADULT - SUBJECTIVE AND OBJECTIVE BOX
Date/Time Patient Seen:  		  Referring MD:   Data Reviewed	       Patient is a 73y old  Female who presents with a chief complaint of shortness of breath (04 Sep 2018 20:08)  vs and meds reviewed      Subjective/HPI     PAST MEDICAL & SURGICAL HISTORY:  Coronary artery disease  Atrial fibrillation, unspecified type  Essential hypertension  Hypokalemia  Hyperlipidemia  GERD (gastroesophageal reflux disease)  Depression  COPD (chronic obstructive pulmonary disease)  No significant past surgical history        Medication list         MEDICATIONS  (STANDING):  ALBUTerol    0.083% 2.5 milliGRAM(s) Nebulizer every 6 hours  atorvastatin 80 milliGRAM(s) Oral at bedtime  buDESOnide 160 MICROgram(s)/formoterol 4.5 MICROgram(s) Inhaler 2 Puff(s) Inhalation two times a day  cholecalciferol 2000 Unit(s) Oral daily  digoxin     Tablet 0.125 milliGRAM(s) Oral daily  DULoxetine 60 milliGRAM(s) Oral daily  enoxaparin Injectable 130 milliGRAM(s) SubCutaneous two times a day  famotidine    Tablet 20 milliGRAM(s) Oral daily  furosemide    Tablet 80 milliGRAM(s) Oral every 12 hours  influenza   Vaccine 0.5 milliLiter(s) IntraMuscular once  lisinopril 40 milliGRAM(s) Oral daily  loratadine 10 milliGRAM(s) Oral daily  piperacillin/tazobactam IVPB. 3.375 Gram(s) IV Intermittent every 8 hours  predniSONE   Tablet 30 milliGRAM(s) Oral two times a day  sotalol 120 milliGRAM(s) Oral every 12 hours  tiotropium 18 MICROgram(s) Capsule 1 Capsule(s) Inhalation daily  verapamil  milliGRAM(s) Oral daily    MEDICATIONS  (PRN):  oxyCODONE    IR 5 milliGRAM(s) Oral every 6 hours PRN Severe Pain (7 - 10)         Vitals log        ICU Vital Signs Last 24 Hrs  T(C): 36.6 (05 Sep 2018 04:32), Max: 37.4 (04 Sep 2018 19:36)  T(F): 97.8 (05 Sep 2018 04:32), Max: 99.3 (04 Sep 2018 19:36)  HR: 69 (05 Sep 2018 04:32) (67 - 78)  BP: 105/65 (05 Sep 2018 04:32) (101/58 - 125/59)  BP(mean): --  ABP: --  ABP(mean): --  RR: 18 (05 Sep 2018 04:32) (17 - 20)  SpO2: 98% (05 Sep 2018 04:32) (94% - 98%)           Input and Output:  I&O's Detail    04 Sep 2018 07:01  -  05 Sep 2018 07:00  --------------------------------------------------------  IN:    IV PiggyBack: 75 mL    Oral Fluid: 120 mL  Total IN: 195 mL    OUT:  Total OUT: 0 mL    Total NET: 195 mL      05 Sep 2018 07:01  -  05 Sep 2018 07:38  --------------------------------------------------------  IN:    IV PiggyBack: 25 mL  Total IN: 25 mL    OUT:  Total OUT: 0 mL    Total NET: 25 mL          Lab Data                        12.9   9.85  )-----------( 132      ( 04 Sep 2018 08:01 )             38.7     09-04    138  |  94<L>  |  19  ----------------------------<  137<H>  3.8   |  38<H>  |  0.59    Ca    9.3      04 Sep 2018 08:01    TPro  6.4  /  Alb  3.0<L>  /  TBili  0.7  /  DBili  x   /  AST  25  /  ALT  16  /  AlkPhos  102  09-04    ABG - ( 04 Sep 2018 15:05 )  pH, Arterial: 7.51  pH, Blood: x     /  pCO2: 45    /  pO2: 76    / HCO3: 35    / Base Excess: 11.7  /  SaO2: 97                CARDIAC MARKERS ( 04 Sep 2018 08:01 )  1.310 ng/mL / x     / 90 U/L / x     / 7.6 ng/mL  CARDIAC MARKERS ( 04 Sep 2018 01:06 )  1.720 ng/mL / x     / 186 U/L / x     / 9.6 ng/mL  CARDIAC MARKERS ( 03 Sep 2018 19:29 )  1.460 ng/mL / x     / 103 U/L / x     / 9.3 ng/mL        Review of Systems	      Objective     Physical Examination    heart s1s2  lung dec BS  obese  cn grossly int      Pertinent Lab findings & Imaging      Tayler:  CHITO   Adequate UO     I&O's Detail    04 Sep 2018 07:01  -  05 Sep 2018 07:00  --------------------------------------------------------  IN:    IV PiggyBack: 75 mL    Oral Fluid: 120 mL  Total IN: 195 mL    OUT:  Total OUT: 0 mL    Total NET: 195 mL      05 Sep 2018 07:01  -  05 Sep 2018 07:38  --------------------------------------------------------  IN:    IV PiggyBack: 25 mL  Total IN: 25 mL    OUT:  Total OUT: 0 mL    Total NET: 25 mL               Discussed with:     Cultures:	        Radiology
Contact Number: [phone]    Patient is a 73y old  Female who presents with a chief complaint of respiratory distress (05 Sep 2018 07:38)      SUBJECTIVE / OVERNIGHT EVENTS: states she is feeling better; denies Cp; SOB has improved; no cough, rhinnorhea    MEDICATIONS  (STANDING):  ALBUTerol    0.083% 2.5 milliGRAM(s) Nebulizer every 6 hours  atorvastatin 80 milliGRAM(s) Oral at bedtime  buDESOnide 160 MICROgram(s)/formoterol 4.5 MICROgram(s) Inhaler 2 Puff(s) Inhalation two times a day  cholecalciferol 2000 Unit(s) Oral daily  digoxin     Tablet 0.125 milliGRAM(s) Oral daily  DULoxetine 60 milliGRAM(s) Oral daily  enoxaparin Injectable 130 milliGRAM(s) SubCutaneous two times a day  famotidine    Tablet 20 milliGRAM(s) Oral daily  furosemide    Tablet 80 milliGRAM(s) Oral every 12 hours  influenza   Vaccine 0.5 milliLiter(s) IntraMuscular once  lisinopril 40 milliGRAM(s) Oral daily  loratadine 10 milliGRAM(s) Oral daily  piperacillin/tazobactam IVPB. 3.375 Gram(s) IV Intermittent every 8 hours  predniSONE   Tablet 30 milliGRAM(s) Oral two times a day  sotalol 120 milliGRAM(s) Oral every 12 hours  tiotropium 18 MICROgram(s) Capsule 1 Capsule(s) Inhalation daily  verapamil  milliGRAM(s) Oral daily    MEDICATIONS  (PRN):  oxyCODONE    IR 5 milliGRAM(s) Oral every 6 hours PRN Severe Pain (7 - 10)      Vital Signs Last 24 Hrs  T(C): 36.6 (05 Sep 2018 07:58), Max: 37.4 (04 Sep 2018 19:36)  T(F): 97.8 (05 Sep 2018 07:58), Max: 99.3 (04 Sep 2018 19:36)  HR: 67 (05 Sep 2018 07:58) (67 - 78)  BP: 94/60 (05 Sep 2018 07:58) (94/60 - 125/59)  BP(mean): --  RR: 20 (05 Sep 2018 07:58) (17 - 20)  SpO2: 95% (05 Sep 2018 07:58) (94% - 98%)  CAPILLARY BLOOD GLUCOSE        I&O's Summary    04 Sep 2018 07:01  -  05 Sep 2018 07:00  --------------------------------------------------------  IN: 195 mL / OUT: 0 mL / NET: 195 mL    05 Sep 2018 07:01  -  05 Sep 2018 10:19  --------------------------------------------------------  IN: 25 mL / OUT: 0 mL / NET: 25 mL        TELEMETRY: Af, Vpaced 60s    PHYSICAL EXAM:  GENERAL: NAD, obese  EYES: EOMI, PERRLA, conjunctiva and sclera clear  NECK: Supple, No JVD  CHEST/LUNG: Clear to auscultation bilaterally; No wheeze  HEART: irregular rate and rhythm  ABDOMEN: Soft, Nontender, Nondistended; Bowel sounds present  EXTREMITIES:  2+ Peripheral Pulses, No clubbing, cyanosis, or edema  PSYCH: AAOx3  NEUROLOGY: no gross sensory deficits; muscle strength 5/5 b/l UE and LE      LABS:                        12.5   12.48 )-----------( 140      ( 05 Sep 2018 08:47 )             37.8     09-05    135  |  91<L>  |  22  ----------------------------<  173<H>  4.1   |  38<H>  |  0.78    Ca    9.3      05 Sep 2018 08:47    TPro  6.4  /  Alb  3.0<L>  /  TBili  0.7  /  DBili  x   /  AST  25  /  ALT  16  /  AlkPhos  102  09-04    LIVER FUNCTIONS - ( 04 Sep 2018 08:01 )  Alb: 3.0 g/dL / Pro: 6.4 g/dL / ALK PHOS: 102 U/L / ALT: 16 U/L / AST: 25 U/L / GGT: x           PT/INR - ( 05 Sep 2018 08:47 )   PT: 17.6 sec;   INR: 1.60 ratio         PTT - ( 03 Sep 2018 18:29 )  PTT:24.9 sec  CARDIAC MARKERS ( 04 Sep 2018 08:01 )  1.310 ng/mL / x     / 90 U/L / x     / 7.6 ng/mL  CARDIAC MARKERS ( 04 Sep 2018 01:06 )  1.720 ng/mL / x     / 186 U/L / x     / 9.6 ng/mL  CARDIAC MARKERS ( 03 Sep 2018 19:29 )  1.460 ng/mL / x     / 103 U/L / x     / 9.3 ng/mL          RADIOLOGY & ADDITIONAL TESTS:    Imaging Personally Reviewed:    Consultant(s) Notes Reviewed:      Care Discussed with Consultants/Other Providers: Dr. Colunga regarding management of BIPAP
Date/Time Patient Seen:  		  Referring MD:   Data Reviewed	       Patient is a 73y old  Female who presents with a chief complaint of respiratory distress (03 Sep 2018 20:25)  vs and meds reviewed      Subjective/HPI     PAST MEDICAL & SURGICAL HISTORY:  Coronary artery disease  Atrial fibrillation, unspecified type  Essential hypertension  Hypokalemia  Hyperlipidemia  GERD (gastroesophageal reflux disease)  Depression  COPD (chronic obstructive pulmonary disease)  No significant past surgical history        Medication list         MEDICATIONS  (STANDING):  ALBUTerol    0.083% 2.5 milliGRAM(s) Nebulizer every 6 hours  enoxaparin Injectable 130 milliGRAM(s) SubCutaneous two times a day  methylPREDNISolone sodium succinate Injectable 30 milliGRAM(s) IV Push three times a day    MEDICATIONS  (PRN):         Vitals log        ICU Vital Signs Last 24 Hrs  T(C): 36.3 (04 Sep 2018 06:14), Max: 37.1 (03 Sep 2018 21:54)  T(F): 97.4 (04 Sep 2018 06:14), Max: 98.8 (03 Sep 2018 21:54)  HR: 79 (04 Sep 2018 06:14) (62 - 97)  BP: 115/23 (04 Sep 2018 06:14) (107/70 - 148/107)  BP(mean): --  ABP: --  ABP(mean): --  RR: 18 (04 Sep 2018 06:14) (18 - 30)  SpO2: 98% (04 Sep 2018 06:14) (96% - 100%)           Input and Output:  I&O's Detail      Lab Data                        14.1   8.58  )-----------( 165      ( 03 Sep 2018 18:26 )             42.1     09-03    136  |  94<L>  |  14  ----------------------------<  210<H>  5.0   |  37<H>  |  0.81    Ca    9.6      03 Sep 2018 17:30    TPro  7.8  /  Alb  3.6  /  TBili  1.1  /  DBili  x   /  AST  38<H>  /  ALT  22  /  AlkPhos  123<H>  09-03    ABG - ( 03 Sep 2018 20:11 )  pH, Arterial: 7.41  pH, Blood: x     /  pCO2: 58    /  pO2: 112   / HCO3: 34    / Base Excess: 11.2  /  SaO2: 99                CARDIAC MARKERS ( 04 Sep 2018 01:06 )  1.720 ng/mL / x     / 186 U/L / x     / 9.6 ng/mL  CARDIAC MARKERS ( 03 Sep 2018 19:29 )  1.460 ng/mL / x     / 103 U/L / x     / 9.3 ng/mL        Review of Systems	      Objective     Physical Examination    heart s1s2  lung dec BS  abd soft  alert  verbal  on BIPAP      Pertinent Lab findings & Imaging      Lester:  NO   Adequate UO     I&O's Detail           Discussed with:     Cultures:	        Radiology
Date/Time Patient Seen:  		  Referring MD:   Data Reviewed	       Patient is a 73y old  Female who presents with a chief complaint of respiratory distress (05 Sep 2018 13:12)  in bed  on o2 support      Subjective/HPI     PAST MEDICAL & SURGICAL HISTORY:  Coronary artery disease  Atrial fibrillation, unspecified type  Essential hypertension  Hypokalemia  Hyperlipidemia  GERD (gastroesophageal reflux disease)  Depression  COPD (chronic obstructive pulmonary disease)  No significant past surgical history        Medication list         MEDICATIONS  (STANDING):  ALBUTerol    0.083% 2.5 milliGRAM(s) Nebulizer every 6 hours  atorvastatin 80 milliGRAM(s) Oral at bedtime  buDESOnide 160 MICROgram(s)/formoterol 4.5 MICROgram(s) Inhaler 2 Puff(s) Inhalation two times a day  cholecalciferol 2000 Unit(s) Oral daily  digoxin     Tablet 0.125 milliGRAM(s) Oral daily  DULoxetine 60 milliGRAM(s) Oral daily  enoxaparin Injectable 130 milliGRAM(s) SubCutaneous two times a day  famotidine    Tablet 20 milliGRAM(s) Oral daily  furosemide    Tablet 80 milliGRAM(s) Oral every 12 hours  influenza   Vaccine 0.5 milliLiter(s) IntraMuscular once  lisinopril 40 milliGRAM(s) Oral daily  loratadine 10 milliGRAM(s) Oral daily  piperacillin/tazobactam IVPB. 3.375 Gram(s) IV Intermittent every 8 hours  predniSONE   Tablet 30 milliGRAM(s) Oral two times a day  sotalol 120 milliGRAM(s) Oral every 12 hours  tiotropium 18 MICROgram(s) Capsule 1 Capsule(s) Inhalation daily  verapamil  milliGRAM(s) Oral daily    MEDICATIONS  (PRN):  oxyCODONE    IR 5 milliGRAM(s) Oral every 6 hours PRN Severe Pain (7 - 10)         Vitals log        ICU Vital Signs Last 24 Hrs  T(C): 36.9 (06 Sep 2018 08:00), Max: 37.4 (05 Sep 2018 23:36)  T(F): 98.5 (06 Sep 2018 08:00), Max: 99.3 (05 Sep 2018 23:36)  HR: 65 (06 Sep 2018 08:00) (59 - 83)  BP: 101/75 (06 Sep 2018 08:00) (100/56 - 114/61)  BP(mean): --  ABP: --  ABP(mean): --  RR: 18 (06 Sep 2018 08:00) (18 - 19)  SpO2: 96% (06 Sep 2018 08:00) (93% - 98%)           Input and Output:  I&O's Detail    05 Sep 2018 07:01  -  06 Sep 2018 07:00  --------------------------------------------------------  IN:    IV PiggyBack: 25 mL    Oral Fluid: 200 mL  Total IN: 225 mL    OUT:    Voided: 600 mL  Total OUT: 600 mL    Total NET: -375 mL          Lab Data                        11.0   7.84  )-----------( 120      ( 06 Sep 2018 06:34 )             34.5     09-06    138  |  93<L>  |  20  ----------------------------<  114<H>  4.3   |  42<H>  |  0.61    Ca    8.7      06 Sep 2018 06:34      ABG - ( 04 Sep 2018 15:05 )  pH, Arterial: 7.51  pH, Blood: x     /  pCO2: 45    /  pO2: 76    / HCO3: 35    / Base Excess: 11.7  /  SaO2: 97                      Review of Systems	      Objective     Physical Examination  heart s1s2  lung dec BS  abd soft  obese        Pertinent Lab findings & Imaging      Tayler:  NO   Adequate UO     I&O's Detail    05 Sep 2018 07:01  -  06 Sep 2018 07:00  --------------------------------------------------------  IN:    IV PiggyBack: 25 mL    Oral Fluid: 200 mL  Total IN: 225 mL    OUT:    Voided: 600 mL  Total OUT: 600 mL    Total NET: -375 mL               Discussed with:     Cultures:	        Radiology
Follow up: AF, HTN, PPM    HPI:  71 yo f pmh afib, cad, htn, hld, cardiac ablation, cva, copd, mdd is coming from [place] for respiratory distress.  At the facility, patient was being treated for possible COPD exacerbation/CHF exacerbation/lobar pneumonia.  She was given Duonebs, Solumedrol 125mg, rocephin, and lasix at the facility but she wasn't improving so she was bought in with CPAP to [place] ED to be evaluated.  Pt was put on BIPAP in the ED, evaluated by Pulmonary, recs appreciated.  ICU consulted, but pt was not a candidate.  ABG improved after an hour of BIPAP.  Pt was lethargic when interviewing so unable to get a proper history.   In the ED, pt's D-Dimer elevated, Troponin elevated, likely from demand ischemia, did not see any acute ST changes on EKG. (03 Sep 2018 20:25)    She is now awake and alert, being treated for COPD exacerbation. Her breathing is improved although she does complain of dizziness when turning her head or getting up.      PAST MEDICAL & SURGICAL HISTORY:  Coronary artery disease  Atrial fibrillation, unspecified type  Essential hypertension  Hypokalemia  Hyperlipidemia  GERD (gastroesophageal reflux disease)  Depression  COPD (chronic obstructive pulmonary disease)  No significant past surgical history      MEDICATIONS  (STANDING):  ALBUTerol    0.083% 2.5 milliGRAM(s) Nebulizer every 6 hours  atorvastatin 80 milliGRAM(s) Oral at bedtime  buDESOnide 160 MICROgram(s)/formoterol 4.5 MICROgram(s) Inhaler 2 Puff(s) Inhalation two times a day  cholecalciferol 2000 Unit(s) Oral daily  digoxin     Tablet 0.125 milliGRAM(s) Oral daily  DULoxetine 60 milliGRAM(s) Oral daily  famotidine    Tablet 20 milliGRAM(s) Oral daily  furosemide    Tablet 80 milliGRAM(s) Oral every 12 hours  influenza   Vaccine 0.5 milliLiter(s) IntraMuscular once  lisinopril 40 milliGRAM(s) Oral daily  loratadine 10 milliGRAM(s) Oral daily  piperacillin/tazobactam IVPB. 3.375 Gram(s) IV Intermittent every 8 hours  predniSONE   Tablet 30 milliGRAM(s) Oral two times a day  sotalol 120 milliGRAM(s) Oral every 12 hours  tiotropium 18 MICROgram(s) Capsule 1 Capsule(s) Inhalation daily  verapamil  milliGRAM(s) Oral daily  warfarin 5 milliGRAM(s) Oral once    MEDICATIONS  (PRN):  oxyCODONE    IR 5 milliGRAM(s) Oral every 6 hours PRN Severe Pain (7 - 10)    Vital Signs Last 24 Hrs  T(C): 36.9 (06 Sep 2018 08:00), Max: 37.4 (05 Sep 2018 23:36)  T(F): 98.5 (06 Sep 2018 08:00), Max: 99.3 (05 Sep 2018 23:36)  HR: 65 (06 Sep 2018 08:00) (59 - 83)  BP: 101/75 (06 Sep 2018 08:00) (100/56 - 114/61)  BP(mean): --  RR: 18 (06 Sep 2018 08:00) (18 - 19)  SpO2: 96% (06 Sep 2018 08:00) (93% - 98%)    I&O's Summary    05 Sep 2018 07:01  -  06 Sep 2018 07:00  --------------------------------------------------------  IN: 225 mL / OUT: 600 mL / NET: -375 mL        PHYSICAL EXAM:    Constitutional: NAD, awake and alert, well-developed  Eyes:   Pupils round, no lesions  ENMT: no exudate or erythema  Pulmonary: Non-labored, breath sounds are clear bilaterally, No wheezing, rales or rhonchi  Cardiovascular: PMI not palpable irreg normal S1 and S2, no murmurs, rubs, gallops or clicks  Gastrointestinal: Bowel Sounds present, soft, nontender.   Lymph: No cervical lymphadenopathy.  Neurological: Alert, no focal deficits  Skin: No rashes.  No cyanosis.  Psych:  Mood & affect appropriate   Ext: No lower ext edema                          11.0   7.84  )-----------( 120      ( 06 Sep 2018 06:34 )             34.5     09-06    138  |  93<L>  |  20  ----------------------------<  114<H>  4.3   |  42<H>  |  0.61    Ca    8.7      06 Sep 2018 06:34      < from: 12 Lead ECG (09.03.18 @ 17:07) >    Ventricular Rate 73 BPM    Atrial Rate 241 BPM    QRS Duration 84 ms    Q-T Interval 426 ms    QTC Calculation(Bezet) 469 ms    R Axis -44 degrees    T Axis 24 degrees    Diagnosis Line Atrial fibrillation with pacemaker functioning in VVI mode.  Left axis deviation  Anterior infarct , age undetermined  Abnormal ECG    Confirmed by Speedy Espinoza ([id number]) on 9/4/2018 12:07:46 PM    < end of copied text >    < from: CT Angio Chest w/ IV Cont (09.04.18 @ 09:17) >    EXAM:  CT ANGIO CHEST (W)AW IC                            PROCEDURE DATE:  09/04/2018          INTERPRETATION:  History: Respiratory failure    CTA chest.    Axial images augmented by coronal sagittal reformats, 3-D MIP images.  91 cc Omnipaque 350injected intravenously.  Satisfactory contrast bolus.  No pulmonary emboli. Nonaneurysmal thoracic aorta. Scattered nonspecific   subcentimeter and borderline enlarged mediastinal lymph nodes likely   reactive. Central airways patent. There is nonspecific groundglass   infiltrate in the anterior right upper lobe likely   inflammatory/infectious. Neoplastic etiology less likely. Streaky   fibrotic atelectatic changes lung bases. Small layering bilateral pleural   effusions right larger than left.   Borderline cardiomegaly with coronary artery calcification. No   pericardial abnormality. In situ cardiac pacer.  Limited views upper abdomen demonstrate lobulated hepatic morphology   possible cirrhosis.  No acute skeletal abnormality.    Impression:    Negative for pulmonary emboli.  Nonspecific right upper lobe groundglass infiltrate likely   inflammatory/infectious. Please follow to resolution.  Small bilateral pleural effusions right larger than left.                FREDDY PERALTA M.D., ATTENDING RADIOLOGIST  This document has been electronically signed. Sep  4 2018 10:12AM                < end of copied text >  < from: TTE Echo Doppler w/o Cont (09.04.18 @ 11:36) >     EXAM:  ECHO TTE W/O CON COMP W/DOPPLR         PROCEDURE DATE:  09/04/2018        INTERPRETATION:  Ordering Physician: PRIYA MIRELES [id number]    Indication: CHF    Study Quality: Technically difficult   A complete echocardiographic study was performed utilizing standard   protocol including spectral and color Doppler in all echocardiographic   windows.    Height: 167.6 cm  Weight: 127 kg  BSA: 2.31 sq m  Blood Pressure: 121/63    MEASUREMENTS  IVS: 1.2cm  PWT: 1.2cm  LA: 4.2cm  AO: 3.5cm  LVIDd: 5.0cm  LVIDs: 3.1cm  LVOT: 2.2cm    RVSP: 63mmHg    FINDINGS  Left Ventricle: The endocardium is not well-visualized. The distal   inferior, distal inferolateral, distal anterior and distal anteroseptal   walls, along with the apex, appear hypokinetic. The basal areas appear   hyperkinetic. Overall EF in the 45-50% range.  Aortic Valve: Calcified aortic valve with decreased opening. The peak   aortic gradient is equal to 28 mmHg, and the mean aortic gradient is   equal to 11 mmHg, consistent with mild to moderate aortic stenosis.  Mitral Valve: Calcified mitral valve with normal diastolic opening. Mild   mitral regurgitation  Tricuspid Valve: Mild to moderate tricuspid regurgitation.  Pulmonic Valve: Not well-visualized. Trace pulmonic insufficiency.  Left Atrium: Mild left atrial enlargement  Right Ventricle: A device wire is noted in the right heart. The RV itself   is not well-visualized. Grossly normal RV size and systolic function.  Right Atrium: Grossly normal.  Diastolic Function: Doppler evidence of diastolic dysfunction  Pericardium/Pleura: No pericardial effusion  Hemodynamics: The pulmonary artery systolic pressure is estimated to be   63 mmHg, assuming the right atrial pressures equal to 8 mmHg, consistent   with severely elevated pulmonary pressures. The IVC is dilated at 2.4 cm,   but collapses greater than 50% with respiration.    CONCLUSIONS:  1. Technically difficult and limited study  2.  The endocardium is not well-visualized. The distal inferior, distal   inferolateral, distal anterior and distal anteroseptal walls, along with   the apex, appear hypokinetic. The basal areas appear hyperkinetic.   Overall EF in the 45-50% range. Would consider repeating study with   echocontrast for better EF evaluation.  3. Calcified aortic valve with decreased opening. The peak aortic   gradient is equal to 28 mmHg, and the mean aortic gradient is equal to 11   mmHg, consistent with mild to moderate aortic stenosis.  4. Calcified mitral valve with normal diastolic opening. Mild mitral   regurgitation. Mild left atrial enlargement  5. A device wire is noted in the right heart. The RV itself is not   well-visualized. Grossly normal RV size and systolic function.  6. The pulmonary artery systolic pressure is estimated to be 63 mmHg,   assuming the right atrial pressures equal to 8 mmHg, consistent with   severely elevated pulmonary pressures.  7. No pericardial effusion.    No prior exam for comparison.                  CARLOS MACHADO   This document has been electronically signed. Sep  5 2018 11:21AM                < end of copied text >
Resident Progress Note    Patient is a 73y old  Female who presents with a chief complaint of respiratory distress (06 Sep 2018 08:37)      HPI: Patient seen and examined at bedside, sitting comfortably with nasal canula 3L. No acute events overnight. Reports improved dyspnea from time of admission along with decreased peripheral edema. Today endorses HA and nauseous Denies fever, chills, CP, cough, palpitations, abdominal pain.     ROS:  Constitutional: denies fever, chills  HEENT: denies vision changes  Respiratory: + SOB, SIMS but denies cough  Cardiovascular: denies chest pain, palpitations, edema  Gastrointestinal: + nausea; denies vomiting, diarrhea, constipation, abdominal pain  Genitourinary: denies dysuria, hematuria   Musculoskeletal: denies myalgias, joint swelling, muscle weakness  Neurologic: +headache with dizziness; denies weakness, paresthesias, numbness/tingling    ROS negative except as noted above    Vital Signs Last 24 Hrs  T(C): 36.9 (09-06-18 @ 08:00), Max: 37.4 (09-05-18 @ 23:36)  T(F): 98.5 (09-06-18 @ 08:00), Max: 99.3 (09-05-18 @ 23:36)  HR: 65 (09-06-18 @ 08:00) (59 - 83)  BP: 101/75 (09-06-18 @ 08:00) (100/56 - 114/61)  BP(mean): --  RR: 18 (09-06-18 @ 08:00) (18 - 19)  SpO2: 96% (09-06-18 @ 08:00) (93% - 98%)    Physical Exam:  General: Well developed, well nourished, NAD, obese pt  HEENT: NCAT, PERRLA, EOMI bl, moist mucous membranes   Neck: Supple, nontender, no mass  Neurology: A&Ox3, nonfocal   Respiratory: diminished bs  CV: irreg rate rhythm  +S1/S2  Abdominal: Soft, NT, ND +BSx4  Extremities: no pitting edema appreciated, No C/C, + peripheral pulses  MSK: Normal ROM, no joint erythema or warmth, no joint swelling   Skin: warm, dry, normal color    LABS:                        11.0   7.84  )-----------( 120      ( 06 Sep 2018 06:34 )             34.5     06 Sep 2018 06:34    138    |  93     |  20     ----------------------------<  114    4.3     |  42     |  0.61     Ca    8.7        06 Sep 2018 06:34      PT/INR - ( 06 Sep 2018 06:34 )   PT: 24.2 sec;   INR: 2.19 ratio       CAPILLARY BLOOD GLUCOSE    RADIOLOGY & ADDITIONAL TESTS:    MEDICATIONS  (STANDING):  ALBUTerol    0.083% 2.5 milliGRAM(s) Nebulizer every 6 hours  atorvastatin 80 milliGRAM(s) Oral at bedtime  buDESOnide 160 MICROgram(s)/formoterol 4.5 MICROgram(s) Inhaler 2 Puff(s) Inhalation two times a day  cholecalciferol 2000 Unit(s) Oral daily  digoxin     Tablet 0.125 milliGRAM(s) Oral daily  DULoxetine 60 milliGRAM(s) Oral daily  famotidine    Tablet 20 milliGRAM(s) Oral daily  furosemide    Tablet 80 milliGRAM(s) Oral every 12 hours  influenza   Vaccine 0.5 milliLiter(s) IntraMuscular once  lisinopril 40 milliGRAM(s) Oral daily  loratadine 10 milliGRAM(s) Oral daily  piperacillin/tazobactam IVPB. 3.375 Gram(s) IV Intermittent every 8 hours  predniSONE   Tablet 30 milliGRAM(s) Oral two times a day  sotalol 120 milliGRAM(s) Oral every 12 hours  tiotropium 18 MICROgram(s) Capsule 1 Capsule(s) Inhalation daily  verapamil  milliGRAM(s) Oral daily  warfarin 5 milliGRAM(s) Oral once    MEDICATIONS  (PRN):  oxyCODONE    IR 5 milliGRAM(s) Oral every 6 hours PRN Severe Pain (7 - 10)      Allergies    No Known Drug Allergies  shellfish (Unknown)    Intolerances
Resident Progress Note    Patient is a 73y old  Female who presents with a chief complaint of respiratory distress (07 Sep 2018 08:38)    Interval Events:  Patient seen and examined at bedside, sitting in chair and eating breakfast. No acute events overnight. Reports improved dyspnea when walking short distances, bed to bathroom. Last BM was prior to admission, pt starting DASH diet today. Denies CP, palpitations, cough, fevers, n/v, abdominal pain.    ROS:  Constitutional: denies fever  HEENT: denies blurry vision  Respiratory: +SOB, SIMS; denies cough, sputum production, wheezing  Cardiovascular: denies chest pain, palpitations, edema  Gastrointestinal: denies nausea, vomiting, diarrhea, constipation, abdominal pain, melena, hematochezia   Genitourinary: denies dysuria, hematuria   Musculoskeletal: denies myalgias, joint swelling, muscle weakness  Neurologic: +dizziness; denies headache, weakness, paresthesias  Hematology/Oncology: + bruising after blood draws b/l arms   ROS negative except as noted above    Vital Signs Last 24 Hrs  T(C): 36.4 (09-07-18 @ 08:00), Max: 37.3 (09-06-18 @ 23:28)  T(F): 97.6 (09-07-18 @ 08:00), Max: 99.1 (09-06-18 @ 23:28)  HR: 71 (09-07-18 @ 08:00) (60 - 75)  BP: 127/84 (09-07-18 @ 08:00) (96/60 - 148/57)  BP(mean): --  RR: 17 (09-07-18 @ 08:00) (17 - 18)  SpO2: 97% (09-07-18 @ 08:00) (96% - 99%)    Physical Exam:  General: NAD, obese   HEENT: NCAT, EOMI bl, moist mucous membranes   Neck: Supple, nontender, no mass  Neurology: A&Ox3, nonfocal  Respiratory: diminished bs b/l  CV: RRR, +S1/S2, no murmurs  Abdominal: Soft, NT, ND +BSx4  Extremities: +pitting edema b/l, No C/C, + peripheral pulses  MSK: Normal ROM, no joint erythema or warmth, no joint swelling   Skin: warm, dry,  b/l upper extremity proximal antecubital ecchymosis     LABS:                        12.0   7.33  )-----------( 132      ( 07 Sep 2018 06:16 )             36.1     07 Sep 2018 06:16    134    |  88     |  19     ----------------------------<  103    3.9     |  42     |  0.61     Ca    8.8        07 Sep 2018 06:16      PT/INR - ( 07 Sep 2018 06:17 )   PT: 30.5 sec;   INR: 2.74 ratio         PTT - ( 07 Sep 2018 06:17 )  PTT:35.3 sec        CAPILLARY BLOOD GLUCOSE            RADIOLOGY & ADDITIONAL TESTS:    MEDICATIONS  (STANDING):  ALBUTerol    0.083% 2.5 milliGRAM(s) Nebulizer every 6 hours  atorvastatin 80 milliGRAM(s) Oral at bedtime  buDESOnide 160 MICROgram(s)/formoterol 4.5 MICROgram(s) Inhaler 2 Puff(s) Inhalation two times a day  cholecalciferol 2000 Unit(s) Oral daily  docusate sodium 100 milliGRAM(s) Oral two times a day  DULoxetine 60 milliGRAM(s) Oral daily  famotidine    Tablet 20 milliGRAM(s) Oral daily  furosemide    Tablet 80 milliGRAM(s) Oral every 12 hours  influenza   Vaccine 0.5 milliLiter(s) IntraMuscular once  lisinopril 40 milliGRAM(s) Oral daily  loratadine 10 milliGRAM(s) Oral daily  metoprolol tartrate 50 milliGRAM(s) Oral two times a day  piperacillin/tazobactam IVPB. 3.375 Gram(s) IV Intermittent every 8 hours  predniSONE   Tablet 30 milliGRAM(s) Oral daily  senna 2 Tablet(s) Oral at bedtime  tiotropium 18 MICROgram(s) Capsule 1 Capsule(s) Inhalation daily  verapamil  milliGRAM(s) Oral daily  warfarin 2.5 milliGRAM(s) Oral once    MEDICATIONS  (PRN):  oxyCODONE    IR 5 milliGRAM(s) Oral every 6 hours PRN Severe Pain (7 - 10)      Allergies    No Known Drug Allergies  shellfish (Unknown)    Intolerances
[facility] [place] [facility]    Annamaria Rodriguez, Gabriela, Keshia, Marla, Bismark, Skyla      [phone]    CHIEF COMPLAINT: Patient is a 73y old  Female who presents with a chief complaint of respiratory distress (07 Sep 2018 06:35)      Follow Up: af, ppm, pos trop/ddimer/bnp    Interim history: The patient reports no new symptoms.  Denies chest discomfort and shortness of breath.  No abdominal pain.  No new neurologic symptoms.      MEDICATIONS  (STANDING):  ALBUTerol    0.083% 2.5 milliGRAM(s) Nebulizer every 6 hours  atorvastatin 80 milliGRAM(s) Oral at bedtime  buDESOnide 160 MICROgram(s)/formoterol 4.5 MICROgram(s) Inhaler 2 Puff(s) Inhalation two times a day  cholecalciferol 2000 Unit(s) Oral daily  docusate sodium 100 milliGRAM(s) Oral two times a day  DULoxetine 60 milliGRAM(s) Oral daily  famotidine    Tablet 20 milliGRAM(s) Oral daily  furosemide    Tablet 80 milliGRAM(s) Oral every 12 hours  influenza   Vaccine 0.5 milliLiter(s) IntraMuscular once  lisinopril 40 milliGRAM(s) Oral daily  loratadine 10 milliGRAM(s) Oral daily  piperacillin/tazobactam IVPB. 3.375 Gram(s) IV Intermittent every 8 hours  predniSONE   Tablet 30 milliGRAM(s) Oral daily  senna 2 Tablet(s) Oral at bedtime  sotalol 120 milliGRAM(s) Oral every 12 hours  tiotropium 18 MICROgram(s) Capsule 1 Capsule(s) Inhalation daily  verapamil  milliGRAM(s) Oral daily    MEDICATIONS  (PRN):  oxyCODONE    IR 5 milliGRAM(s) Oral every 6 hours PRN Severe Pain (7 - 10)      REVIEW OF SYSTEMS:  eye, ent, GI, , allergic, dermatologic, musculoskeletal and neurologic are negative except as described above    Vital Signs Last 24 Hrs  T(C): 36.4 (07 Sep 2018 08:00), Max: 37.3 (06 Sep 2018 23:28)  T(F): 97.6 (07 Sep 2018 08:00), Max: 99.1 (06 Sep 2018 23:28)  HR: 71 (07 Sep 2018 08:00) (60 - 75)  BP: 127/84 (07 Sep 2018 08:00) (96/60 - 148/57)  BP(mean): --  RR: 17 (07 Sep 2018 08:00) (17 - 18)  SpO2: 97% (07 Sep 2018 08:00) (96% - 99%)    I&O's Summary    06 Sep 2018 07:01  -  07 Sep 2018 07:00  --------------------------------------------------------  IN: 420 mL / OUT: 700 mL / NET: -280 mL        Telemetry past 24h: af     PHYSICAL EXAM:    Constitutional: obese, NAD   HEENT:  MMM, sclerae anicteric, conjunctivae clear, no oral cyanosis.  Pulmonary: Non-labored, rhonchi with decr bs pedro  Cardiovascular: Regular, S1 and S2.  2/6 sys murmur.  No rubs, gallops or clicks  Gastrointestinal: Bowel Sounds present, soft, nontender.   Lymph: No peripheral edema.   Neurological: Alert, no focal deficits  Skin: No rashes.  Psych:  Mood & affect appropriate    LABS: All Labs Reviewed:                        12.0   7.33  )-----------( 132      ( 07 Sep 2018 06:16 )             36.1                         11.0   7.84  )-----------( 120      ( 06 Sep 2018 06:34 )             34.5                         12.5   12.48 )-----------( 140      ( 05 Sep 2018 08:47 )             37.8     07 Sep 2018 06:16    134    |  88     |  19     ----------------------------<  103    3.9     |  42     |  0.61   06 Sep 2018 06:34    138    |  93     |  20     ----------------------------<  114    4.3     |  42     |  0.61   05 Sep 2018 08:47    135    |  91     |  22     ----------------------------<  173    4.1     |  38     |  0.78     Ca    8.8        07 Sep 2018 06:16  Ca    8.7        06 Sep 2018 06:34  Ca    9.3        05 Sep 2018 08:47      PT/INR - ( 07 Sep 2018 06:17 )   PT: 30.5 sec;   INR: 2.74 ratio         PTT - ( 07 Sep 2018 06:17 )  PTT:35.3 sec      Blood Culture: Organism --  Gram Stain Blood -- Gram Stain --  Specimen Source .Blood Blood-Peripheral  Culture-Blood --            RADIOLOGY:    EKG:    Echo:
[facility] -- Annamaria Rodriguez, Keshia Ochoa, Bismark Gutiérrez Savella  Office # [phone]      Follow Up:  Respiratory distress    Subjective/Observations: Seen and examined.  Lying flat in bed feeling better today.  Did not use Bipap last night on supplemental oxygen O2 sat is good.  No reports of cp sob or palpitations.  No signs of orthopnea or PND.        REVIEW OF SYSTEMS: All other review of systems is negative unless indicated above    PAST MEDICAL & SURGICAL HISTORY:  Coronary artery disease  Atrial fibrillation, unspecified type  Essential hypertension  Hypokalemia  Hyperlipidemia  GERD (gastroesophageal reflux disease)  Depression  COPD (chronic obstructive pulmonary disease)  No significant past surgical history      MEDICATIONS  (STANDING):  ALBUTerol    0.083% 2.5 milliGRAM(s) Nebulizer every 6 hours  atorvastatin 80 milliGRAM(s) Oral at bedtime  buDESOnide 160 MICROgram(s)/formoterol 4.5 MICROgram(s) Inhaler 2 Puff(s) Inhalation two times a day  cholecalciferol 2000 Unit(s) Oral daily  digoxin     Tablet 0.125 milliGRAM(s) Oral daily  DULoxetine 60 milliGRAM(s) Oral daily  enoxaparin Injectable 130 milliGRAM(s) SubCutaneous two times a day  famotidine    Tablet 20 milliGRAM(s) Oral daily  furosemide    Tablet 80 milliGRAM(s) Oral every 12 hours  influenza   Vaccine 0.5 milliLiter(s) IntraMuscular once  lisinopril 40 milliGRAM(s) Oral daily  loratadine 10 milliGRAM(s) Oral daily  piperacillin/tazobactam IVPB. 3.375 Gram(s) IV Intermittent every 8 hours  predniSONE   Tablet 30 milliGRAM(s) Oral two times a day  sotalol 120 milliGRAM(s) Oral every 12 hours  tiotropium 18 MICROgram(s) Capsule 1 Capsule(s) Inhalation daily  verapamil  milliGRAM(s) Oral daily  warfarin 6 milliGRAM(s) Oral once    MEDICATIONS  (PRN):  oxyCODONE    IR 5 milliGRAM(s) Oral every 6 hours PRN Severe Pain (7 - 10)      Allergies    No Known Drug Allergies  shellfish (Unknown)    Intolerances            Vital Signs Last 24 Hrs  T(C): 37.1 (05 Sep 2018 11:55), Max: 37.4 (04 Sep 2018 19:36)  T(F): 98.8 (05 Sep 2018 11:55), Max: 99.3 (04 Sep 2018 19:36)  HR: 61 (05 Sep 2018 11:55) (61 - 77)  BP: 100/64 (05 Sep 2018 11:55) (94/60 - 119/56)  BP(mean): --  RR: 19 (05 Sep 2018 11:55) (18 - 20)  SpO2: 98% (05 Sep 2018 11:55) (94% - 98%)    I&O's Summary    04 Sep 2018 07:01  -  05 Sep 2018 07:00  --------------------------------------------------------  IN: 195 mL / OUT: 0 mL / NET: 195 mL    05 Sep 2018 07:01  -  05 Sep 2018 13:13  --------------------------------------------------------  IN: 25 mL / OUT: 0 mL / NET: 25 mL          PHYSICAL EXAM:  TELE: AF 60s no events  Constitutional: NAD, awake and alert, well-developed. morbidly obese  HEENT: Moist Mucous Membranes, Anicteric  Pulmonary: Non-labored, breath sounds are clear anteriorly   Cardiovascular: Irregular, S1 and S2, No murmurs, rubs, gallops or clicks  Gastrointestinal: Bowel Sounds present, soft, nontender.  Obese abdomen  Lymph: Scant peripheral edema. No lymphadenopathy.  Skin: No visible rashes or ulcers.  Psych:  Mood & affect appropriate    LABS: All Labs Reviewed:                        12.5   12.48 )-----------( 140      ( 05 Sep 2018 08:47 )             37.8                         12.9   9.85  )-----------( 132      ( 04 Sep 2018 08:01 )             38.7                         14.1   8.58  )-----------( 165      ( 03 Sep 2018 18:26 )             42.1     05 Sep 2018 08:47    135    |  91     |  22     ----------------------------<  173    4.1     |  38     |  0.78   04 Sep 2018 08:01    138    |  94     |  19     ----------------------------<  137    3.8     |  38     |  0.59   03 Sep 2018 17:30    136    |  94     |  14     ----------------------------<  210    5.0     |  37     |  0.81     Ca    9.3        05 Sep 2018 08:47  Ca    9.3        04 Sep 2018 08:01  Ca    9.6        03 Sep 2018 17:30    TPro  6.4    /  Alb  3.0    /  TBili  0.7    /  DBili  x      /  AST  25     /  ALT  16     /  AlkPhos  102    04 Sep 2018 08:01  TPro  7.8    /  Alb  3.6    /  TBili  1.1    /  DBili  x      /  AST  38     /  ALT  22     /  AlkPhos  123    03 Sep 2018 17:30    PT/INR - ( 05 Sep 2018 08:47 )   PT: 17.6 sec;   INR: 1.60 ratio         PTT - ( 03 Sep 2018 18:29 )  PTT:24.9 sec  CARDIAC MARKERS ( 04 Sep 2018 08:01 )  1.310 ng/mL / x     / 90 U/L / x     / 7.6 ng/mL  CARDIAC MARKERS ( 04 Sep 2018 01:06 )  1.720 ng/mL / x     / 186 U/L / x     / 9.6 ng/mL  CARDIAC MARKERS ( 03 Sep 2018 19:29 )  1.460 ng/mL / x     / 103 U/L / x     / 9.3 ng/mL    < from: 12 Lead ECG (09.03.18 @ 17:07) >  Ventricular Rate 73 BPM    Atrial Rate 241 BPM    QRS Duration 84 ms    Q-T Interval 426 ms    QTC Calculation(Bezet) 469 ms    R Axis -44 degrees    T Axis 24 degrees    Diagnosis Line Atrial fibrillation with pacemaker functioning in VVI mode.  Left axis deviation  Anterior infarct , age undetermined  Abnormal ECG    Confirmed by Speedy Espinoza ([id number]) on 9/4/2018 12:07:46 PM    < end of copied text >      < from: TTE Echo Doppler w/o Cont (09.04.18 @ 11:36) >   EXAM:  ECHO TTE W/O CON COMP W/DOPPLR         PROCEDURE DATE:  09/04/2018        INTERPRETATION:  Ordering Physician: PRIYA MIRELES [id number]    Indication: CHF    Study Quality: Technically difficult   A complete echocardiographic study was performed utilizing standard   protocol including spectral and color Doppler in all echocardiographic   windows.    Height: 167.6 cm  Weight: 127 kg  BSA: 2.31 sq m  Blood Pressure: 121/63    MEASUREMENTS  IVS: 1.2cm  PWT: 1.2cm  LA: 4.2cm  AO: 3.5cm  LVIDd: 5.0cm  LVIDs: 3.1cm  LVOT: 2.2cm    RVSP: 63mmHg    FINDINGS  Left Ventricle: The endocardium is not well-visualized. The distal   inferior, distal inferolateral, distal anterior and distal anteroseptal   walls, along with the apex, appear hypokinetic. The basal areas appear   hyperkinetic. Overall EF in the 45-50% range.  Aortic Valve: Calcified aortic valve with decreased opening. The peak   aortic gradient is equal to 28 mmHg, and the mean aortic gradient is   equal to 11 mmHg, consistent with mild to moderate aortic stenosis.  Mitral Valve: Calcified mitral valve with normal diastolic opening. Mild   mitral regurgitation  Tricuspid Valve: Mild to moderate tricuspid regurgitation.  Pulmonic Valve: Not well-visualized. Trace pulmonic insufficiency.  Left Atrium: Mild left atrial enlargement  Right Ventricle: A device wire is noted in the right heart. The RV itself   is not well-visualized. Grossly normal RV size and systolic function.  Right Atrium: Grossly normal.  Diastolic Function: Doppler evidence of diastolic dysfunction  Pericardium/Pleura: No pericardial effusion  Hemodynamics: The pulmonary artery systolic pressure is estimated to be   63 mmHg, assuming the right atrial pressures equal to 8 mmHg, consistent   with severely elevated pulmonary pressures. The IVC is dilated at 2.4 cm,   but collapses greater than 50% with respiration.    CONCLUSIONS:  1. Technically difficult and limited study  2.  The endocardium is not well-visualized. The distal inferior, distal   inferolateral, distal anterior and distal anteroseptal walls, along with   the apex, appear hypokinetic. The basal areas appear hyperkinetic.   Overall EF in the 45-50% range. Would consider repeating study with   echocontrast for better EF evaluation.  3. Calcified aortic valve with decreased opening. The peak aortic   gradient is equal to 28 mmHg, and the mean aortic gradient is equal to 11   mmHg, consistent with mild to moderate aortic stenosis.  4. Calcified mitral valve with normal diastolic opening. Mild mitral   regurgitation. Mild left atrial enlargement  5. A device wire is noted in the right heart. The RV itself is not   well-visualized. Grossly normal RV size and systolic function.  6. The pulmonary artery systolic pressure is estimated to be 63 mmHg,   assuming the right atrial pressures equal to 8 mmHg, consistent with   severely elevated pulmonary pressures.  7. No pericardial effusion.    No prior exam for comparison.                  CARLOS MACHADO   This document has been electronically signed. Sep  5 2018 11:21AM                < end of copied text >     < from: US Duplex Venous Lower Ext Complete, Bilateral (09.04.18 @ 10:01) >  EXAM:  US DPLX LWR EXT VEINS COMPL BI                            PROCEDURE DATE:  09/04/2018          INTERPRETATION:  CLINICAL INFORMATION: Shortness of breath, edema    COMPARISON: None available.        TECHNIQUE: Duplex sonography of the BILATERAL LOWER extremities with   color and spectral Doppler, with and without compression.       Technical note indicates difficult study due to the patient's body   habitus.     FINDINGS:    There is normal compressibility of the bilateral common femoral, femoral   and popliteal veins. No calf vein thrombosis is detected.    Doppler examination shows normal spontaneous and phasic flow.    IMPRESSION:     No evidence of bilateral lower extremity deep venous thrombosis.                    JULIA GRACES M.D.,ATTENDING RADIOLOGIST  This document has been electronically signed. Sep  4 2018 11:09AM    < end of copied text >    < from: CT Angio Chest w/ IV Cont (09.04.18 @ 09:17) >  EXAM:  CT ANGIO CHEST (W)AW IC                            PROCEDURE DATE:  09/04/2018          INTERPRETATION:  History: Respiratory failure    CTA chest.    Axial images augmented by coronal sagittal reformats, 3-D MIP images.  91 cc Omnipaque 350injected intravenously.  Satisfactory contrast bolus.  No pulmonary emboli. Nonaneurysmal thoracic aorta. Scattered nonspecific   subcentimeter and borderline enlarged mediastinal lymph nodes likely   reactive. Central airways patent. There is nonspecific groundglass   infiltrate in the anterior right upper lobe likely   inflammatory/infectious. Neoplastic etiology less likely. Streaky   fibrotic atelectatic changes lung bases. Small layering bilateral pleural   effusions right larger than left.   Borderline cardiomegaly with coronary artery calcification. No   pericardial abnormality. In situ cardiac pacer.  Limited views upper abdomen demonstrate lobulated hepatic morphology   possible cirrhosis.  No acute skeletal abnormality.    Impression:    Negative for pulmonary emboli.  Nonspecific right upper lobe groundglass infiltrate likely   inflammatory/infectious. Please follow to resolution.  Small bilateral pleural effusions right larger than left.                FREDDY PERALTA M.D., ATTENDING RADIOLOGIST  This document has been electronically signed. Sep  4 2018 10:12AM    < end of copied text >
Date/Time Patient Seen:  		  Referring MD:   Data Reviewed	       Patient is a 73y old  Female who presents with a chief complaint of respiratory distress (06 Sep 2018 11:30)  in bed  on o2 support  not using BIPAP    vs and meds reviewed      Subjective/HPI     PAST MEDICAL & SURGICAL HISTORY:  Coronary artery disease  Atrial fibrillation, unspecified type  Essential hypertension  Hypokalemia  Hyperlipidemia  GERD (gastroesophageal reflux disease)  Depression  COPD (chronic obstructive pulmonary disease)  No significant past surgical history        Medication list         MEDICATIONS  (STANDING):  ALBUTerol    0.083% 2.5 milliGRAM(s) Nebulizer every 6 hours  atorvastatin 80 milliGRAM(s) Oral at bedtime  buDESOnide 160 MICROgram(s)/formoterol 4.5 MICROgram(s) Inhaler 2 Puff(s) Inhalation two times a day  cholecalciferol 2000 Unit(s) Oral daily  docusate sodium 100 milliGRAM(s) Oral two times a day  DULoxetine 60 milliGRAM(s) Oral daily  famotidine    Tablet 20 milliGRAM(s) Oral daily  furosemide    Tablet 80 milliGRAM(s) Oral every 12 hours  influenza   Vaccine 0.5 milliLiter(s) IntraMuscular once  lisinopril 40 milliGRAM(s) Oral daily  loratadine 10 milliGRAM(s) Oral daily  piperacillin/tazobactam IVPB. 3.375 Gram(s) IV Intermittent every 8 hours  predniSONE   Tablet 30 milliGRAM(s) Oral daily  senna 2 Tablet(s) Oral at bedtime  sotalol 120 milliGRAM(s) Oral every 12 hours  tiotropium 18 MICROgram(s) Capsule 1 Capsule(s) Inhalation daily  verapamil  milliGRAM(s) Oral daily    MEDICATIONS  (PRN):  oxyCODONE    IR 5 milliGRAM(s) Oral every 6 hours PRN Severe Pain (7 - 10)         Vitals log        ICU Vital Signs Last 24 Hrs  T(C): 36.4 (07 Sep 2018 04:53), Max: 37.3 (06 Sep 2018 23:28)  T(F): 97.6 (07 Sep 2018 04:53), Max: 99.1 (06 Sep 2018 23:28)  HR: 64 (07 Sep 2018 04:53) (63 - 75)  BP: 106/63 (07 Sep 2018 04:53) (96/60 - 148/57)  BP(mean): --  ABP: --  ABP(mean): --  RR: 18 (07 Sep 2018 04:53) (18 - 18)  SpO2: 96% (07 Sep 2018 04:53) (96% - 99%)           Input and Output:  I&O's Detail    05 Sep 2018 07:01  -  06 Sep 2018 07:00  --------------------------------------------------------  IN:    IV PiggyBack: 25 mL    Oral Fluid: 200 mL  Total IN: 225 mL    OUT:    Voided: 600 mL  Total OUT: 600 mL    Total NET: -375 mL      06 Sep 2018 07:01  -  07 Sep 2018 06:35  --------------------------------------------------------  IN:    Oral Fluid: 420 mL  Total IN: 420 mL    OUT:    Voided: 500 mL  Total OUT: 500 mL    Total NET: -80 mL          Lab Data                        12.0   7.33  )-----------( 132      ( 07 Sep 2018 06:16 )             36.1     09-06    138  |  93<L>  |  20  ----------------------------<  114<H>  4.3   |  42<H>  |  0.61    Ca    8.7      06 Sep 2018 06:34              Review of Systems	      Objective     Physical Examination    heart s1s2  lung dec BS  abd soft  cn grossly int  obese      Pertinent Lab findings & Imaging      Tayler:  NO   Adequate UO     I&O's Detail    05 Sep 2018 07:01  -  06 Sep 2018 07:00  --------------------------------------------------------  IN:    IV PiggyBack: 25 mL    Oral Fluid: 200 mL  Total IN: 225 mL    OUT:    Voided: 600 mL  Total OUT: 600 mL    Total NET: -375 mL      06 Sep 2018 07:01  -  07 Sep 2018 06:35  --------------------------------------------------------  IN:    Oral Fluid: 420 mL  Total IN: 420 mL    OUT:    Voided: 500 mL  Total OUT: 500 mL    Total NET: -80 mL               Discussed with:     Cultures:	        Radiology
Contact Number: [phone]    Patient is a 73y old  Female who presents with a chief complaint of respiratory distress (03 Sep 2018 20:25)      SUBJECTIVE / OVERNIGHT EVENTS: Feeling better this morning- off BIPAP and now on NC. SOB improved- denies CP    MEDICATIONS  (STANDING):  ALBUTerol    0.083% 2.5 milliGRAM(s) Nebulizer every 6 hours  atorvastatin 80 milliGRAM(s) Oral at bedtime  buDESOnide 160 MICROgram(s)/formoterol 4.5 MICROgram(s) Inhaler 2 Puff(s) Inhalation two times a day  cholecalciferol 2000 Unit(s) Oral daily  digoxin     Tablet 0.125 milliGRAM(s) Oral daily  DULoxetine 60 milliGRAM(s) Oral daily  enoxaparin Injectable 130 milliGRAM(s) SubCutaneous two times a day  famotidine    Tablet 20 milliGRAM(s) Oral daily  furosemide    Tablet 80 milliGRAM(s) Oral every 12 hours  methylPREDNISolone sodium succinate Injectable 30 milliGRAM(s) IV Push three times a day  piperacillin/tazobactam IVPB. 3.375 Gram(s) IV Intermittent every 8 hours  sotalol 120 milliGRAM(s) Oral every 12 hours  tiotropium 18 MICROgram(s) Capsule 1 Capsule(s) Inhalation daily  verapamil  milliGRAM(s) Oral daily    MEDICATIONS  (PRN):  oxyCODONE    IR 5 milliGRAM(s) Oral every 6 hours PRN Severe Pain (7 - 10)      Vital Signs Last 24 Hrs  T(C): 36.3 (04 Sep 2018 06:14), Max: 37.1 (03 Sep 2018 21:54)  T(F): 97.4 (04 Sep 2018 06:14), Max: 98.8 (03 Sep 2018 21:54)  HR: 74 (04 Sep 2018 08:46) (62 - 97)  BP: 121/63 (04 Sep 2018 08:46) (107/70 - 148/107)  BP(mean): --  RR: 18 (04 Sep 2018 08:46) (18 - 30)  SpO2: 94% (04 Sep 2018 08:46) (93% - 100%)  CAPILLARY BLOOD GLUCOSE            PHYSICAL EXAM:  GENERAL: NAD, obese; no accessory respiratory muscle use  EYES: EOMI, PERRLA  NECK: Supple, No JVD  CHEST/LUNG: dec BS At bases, otherwise CTA  HEART: Regular rate and rhythm  ABDOMEN: Soft, Nontender, Nondistended; Bowel sounds present  EXTREMITIES:  2+ Peripheral Pulses, No clubbing, cyanosis, or edema  PSYCH: AAOx3  NEUROLOGY: no gross sensory deficits; muscle strength 5/5 b/l UE and LE      LABS:                        12.9   9.85  )-----------( 132      ( 04 Sep 2018 08:01 )             38.7     09-04    138  |  94<L>  |  19  ----------------------------<  137<H>  3.8   |  38<H>  |  0.59    Ca    9.3      04 Sep 2018 08:01    TPro  6.4  /  Alb  3.0<L>  /  TBili  0.7  /  DBili  x   /  AST  25  /  ALT  16  /  AlkPhos  [birth year]  09[birth year]    LIVER FUNCTIONS - ( 04 Sep 2018 08:01 )  Alb: 3.0 g/dL / Pro: 6.4 g/dL / ALK PHOS: 102 U/L / ALT: 16 U/L / AST: 25 U/L / GGT: x           PT/INR - ( 03 Sep 2018 18:29 )   PT: 12.9 sec;   INR: 1.18 ratio         PTT - ( 03 Sep 2018 18:29 )  PTT:24.9 sec  CARDIAC MARKERS ( 04 Sep 2018 08:01 )  1.310 ng/mL / x     / 90 U/L / x     / 7.6 ng/mL  CARDIAC MARKERS ( 04 Sep 2018 01:06 )  1.720 ng/mL / x     / 186 U/L / x     / 9.6 ng/mL  CARDIAC MARKERS ( 03 Sep 2018 19:29 )  1.460 ng/mL / x     / 103 U/L / x     / 9.3 ng/mL          RADIOLOGY & ADDITIONAL TESTS:    Imaging Personally Reviewed:    Consultant(s) Notes Reviewed:      Care Discussed with Consultants/Other Providers: Dr. Alfaro- discussed abx management and results of CTA chest

## 2018-09-07 NOTE — PROGRESS NOTE ADULT - PROBLEM SELECTOR PLAN 7
c/w duloxetine  oxycodone IR 5mg po q6 prn pain

## 2018-09-07 NOTE — PROGRESS NOTE ADULT - PROBLEM SELECTOR PLAN 4
morbid obesity  PETER  weight loss ed  sleep hygiene ed  will follow  management of comorbidities
Cardio reccs appreciated: will d/c Sotalol and digoxin  Started on Metoprolol 50 BID  As INR is within therapeutic limits, will continue coumadin today at 2.5mg  Continue to monitor INR and PTT
c/w Lisinopril, verapamil

## 2018-09-07 NOTE — PROGRESS NOTE ADULT - ASSESSMENT
72y F with hx of Afib (on coumadin, s/p ablation 200), [employer] pacemaker (2008, revised 2015), CAD, COPD, HTN, HLD,  ?CVA (vs coma; ~2001, attributed to alcoholism) mdd, admitted for acute hypercarbic respiratory failure, likely 2/2 acute COPD exacerbation on superimposed PETER/OHS

## 2018-09-07 NOTE — PROGRESS NOTE ADULT - PROBLEM SELECTOR PROBLEM 5
Congestive heart failure, unspecified HF chronicity, unspecified heart failure type
Coronary artery disease

## 2018-09-07 NOTE — PROGRESS NOTE ADULT - PROBLEM SELECTOR PROBLEM 4
Morbid obesity
Atrial fibrillation, unspecified type
HTN (hypertension)

## 2018-09-07 NOTE — PROGRESS NOTE ADULT - PROBLEM SELECTOR PLAN 3
Echo from [facility] 5/2018 significant for LVEF 73% dilated LV with mild TR, mild LVH  Repeat Echo 9/4: noted reduced EF 45-50%. Distal inferior, distal inferolateral, distal anterior and distal anteroseptal walls, along with the apex appear hypokinetic and the basal areas appear hyperkinetic. Mod AS, mild LAE.   As per cardio: pt reports a nuclear stress test with dr ga within the year; repeat elective stress test may be required given drop in EF    c/w lasix 80mg po bid

## 2018-09-09 LAB
CULTURE RESULTS: SIGNIFICANT CHANGE UP
CULTURE RESULTS: SIGNIFICANT CHANGE UP
SPECIMEN SOURCE: SIGNIFICANT CHANGE UP
SPECIMEN SOURCE: SIGNIFICANT CHANGE UP

## 2018-10-24 PROCEDURE — 71045 X-RAY EXAM CHEST 1 VIEW: CPT

## 2018-10-24 PROCEDURE — 82550 ASSAY OF CK (CPK): CPT

## 2018-10-24 PROCEDURE — 84145 PROCALCITONIN (PCT): CPT

## 2018-10-24 PROCEDURE — 87640 STAPH A DNA AMP PROBE: CPT

## 2018-10-24 PROCEDURE — 85730 THROMBOPLASTIN TIME PARTIAL: CPT

## 2018-10-24 PROCEDURE — 99221 1ST HOSP IP/OBS SF/LOW 40: CPT

## 2018-10-24 PROCEDURE — 97530 THERAPEUTIC ACTIVITIES: CPT

## 2018-10-24 PROCEDURE — 90686 IIV4 VACC NO PRSV 0.5 ML IM: CPT

## 2018-10-24 PROCEDURE — 85610 PROTHROMBIN TIME: CPT

## 2018-10-24 PROCEDURE — 85379 FIBRIN DEGRADATION QUANT: CPT

## 2018-10-24 PROCEDURE — 80048 BASIC METABOLIC PNL TOTAL CA: CPT

## 2018-10-24 PROCEDURE — 80162 ASSAY OF DIGOXIN TOTAL: CPT

## 2018-10-24 PROCEDURE — 87581 M.PNEUMON DNA AMP PROBE: CPT

## 2018-10-24 PROCEDURE — 84484 ASSAY OF TROPONIN QUANT: CPT

## 2018-10-24 PROCEDURE — 84436 ASSAY OF TOTAL THYROXINE: CPT

## 2018-10-24 PROCEDURE — 87798 DETECT AGENT NOS DNA AMP: CPT

## 2018-10-24 PROCEDURE — 71275 CT ANGIOGRAPHY CHEST: CPT

## 2018-10-24 PROCEDURE — 97116 GAIT TRAINING THERAPY: CPT

## 2018-10-24 PROCEDURE — 94664 DEMO&/EVAL PT USE INHALER: CPT

## 2018-10-24 PROCEDURE — 96367 TX/PROPH/DG ADDL SEQ IV INF: CPT

## 2018-10-24 PROCEDURE — 93306 TTE W/DOPPLER COMPLETE: CPT

## 2018-10-24 PROCEDURE — 85027 COMPLETE CBC AUTOMATED: CPT

## 2018-10-24 PROCEDURE — 87641 MR-STAPH DNA AMP PROBE: CPT

## 2018-10-24 PROCEDURE — 94660 CPAP INITIATION&MGMT: CPT

## 2018-10-24 PROCEDURE — 87449 NOS EACH ORGANISM AG IA: CPT

## 2018-10-24 PROCEDURE — 96375 TX/PRO/DX INJ NEW DRUG ADDON: CPT

## 2018-10-24 PROCEDURE — 96372 THER/PROPH/DIAG INJ SC/IM: CPT | Mod: XU

## 2018-10-24 PROCEDURE — 93970 EXTREMITY STUDY: CPT

## 2018-10-24 PROCEDURE — 83605 ASSAY OF LACTIC ACID: CPT

## 2018-10-24 PROCEDURE — 94760 N-INVAS EAR/PLS OXIMETRY 1: CPT

## 2018-10-24 PROCEDURE — 80053 COMPREHEN METABOLIC PANEL: CPT

## 2018-10-24 PROCEDURE — 96365 THER/PROPH/DIAG IV INF INIT: CPT | Mod: XU

## 2018-10-24 PROCEDURE — 87486 CHLMYD PNEUM DNA AMP PROBE: CPT

## 2018-10-24 PROCEDURE — 87633 RESP VIRUS 12-25 TARGETS: CPT

## 2018-10-24 PROCEDURE — 82803 BLOOD GASES ANY COMBINATION: CPT

## 2018-10-24 PROCEDURE — 94640 AIRWAY INHALATION TREATMENT: CPT

## 2018-10-24 PROCEDURE — 99291 CRITICAL CARE FIRST HOUR: CPT | Mod: 25

## 2018-10-24 PROCEDURE — 83880 ASSAY OF NATRIURETIC PEPTIDE: CPT

## 2018-10-24 PROCEDURE — 84443 ASSAY THYROID STIM HORMONE: CPT

## 2018-10-24 PROCEDURE — 87040 BLOOD CULTURE FOR BACTERIA: CPT

## 2018-10-24 PROCEDURE — 97161 PT EVAL LOW COMPLEX 20 MIN: CPT

## 2018-10-24 PROCEDURE — 82553 CREATINE MB FRACTION: CPT

## 2018-10-24 PROCEDURE — 36415 COLL VENOUS BLD VENIPUNCTURE: CPT

## 2018-10-24 PROCEDURE — 86140 C-REACTIVE PROTEIN: CPT

## 2018-10-24 PROCEDURE — 96376 TX/PRO/DX INJ SAME DRUG ADON: CPT

## 2018-10-24 PROCEDURE — 84480 ASSAY TRIIODOTHYRONINE (T3): CPT

## 2018-10-24 PROCEDURE — 93005 ELECTROCARDIOGRAM TRACING: CPT

## 2018-10-24 PROCEDURE — 36600 WITHDRAWAL OF ARTERIAL BLOOD: CPT

## 2019-05-21 ENCOUNTER — OUTPATIENT (OUTPATIENT)
Dept: OUTPATIENT SERVICES | Facility: HOSPITAL | Age: 74
LOS: 1 days | End: 2019-05-21
Payer: COMMERCIAL

## 2019-05-21 DIAGNOSIS — M97.11XA PERIPROSTHETIC FRACTURE AROUND INTERNAL PROSTHETIC RIGHT KNEE JOINT, INITIAL ENCOUNTER: ICD-10-CM

## 2019-05-21 PROBLEM — J44.9 CHRONIC OBSTRUCTIVE PULMONARY DISEASE, UNSPECIFIED: Chronic | Status: ACTIVE | Noted: 2018-09-03

## 2019-05-21 PROBLEM — E78.5 HYPERLIPIDEMIA, UNSPECIFIED: Chronic | Status: ACTIVE | Noted: 2018-09-03

## 2019-05-21 PROBLEM — I48.91 UNSPECIFIED ATRIAL FIBRILLATION: Chronic | Status: ACTIVE | Noted: 2018-09-03

## 2019-05-21 PROBLEM — I25.10 ATHEROSCLEROTIC HEART DISEASE OF NATIVE CORONARY ARTERY WITHOUT ANGINA PECTORIS: Chronic | Status: ACTIVE | Noted: 2018-09-03

## 2019-05-21 PROBLEM — E87.6 HYPOKALEMIA: Chronic | Status: ACTIVE | Noted: 2018-09-03

## 2019-05-21 PROBLEM — I10 ESSENTIAL (PRIMARY) HYPERTENSION: Chronic | Status: ACTIVE | Noted: 2018-09-03

## 2019-05-21 PROBLEM — F32.9 MAJOR DEPRESSIVE DISORDER, SINGLE EPISODE, UNSPECIFIED: Chronic | Status: ACTIVE | Noted: 2018-09-03

## 2019-05-21 PROBLEM — K21.9 GASTRO-ESOPHAGEAL REFLUX DISEASE WITHOUT ESOPHAGITIS: Chronic | Status: ACTIVE | Noted: 2018-09-03

## 2019-05-21 PROCEDURE — 73700 CT LOWER EXTREMITY W/O DYE: CPT

## 2019-05-21 PROCEDURE — 73700 CT LOWER EXTREMITY W/O DYE: CPT | Mod: 26,RT

## 2020-12-21 NOTE — PATIENT PROFILE ADULT. - TYPE OF ADMISSION, PATIENT PROFILE
What Type Of Note Output Would You Prefer (Optional)?: Bullet Format How Severe Is Your Rash?: moderate Is This A New Presentation, Or A Follow-Up?: Rash Emergent/ED

## 2021-09-09 NOTE — PROGRESS NOTE ADULT - PROBLEM SELECTOR PLAN 2
on Prednisone  advair/spiriva restarted on Prednisone; mild leukocytosis likely from steroid   advair/spiriva restarted Quinolones Counseling:  I discussed with the patient the risks of fluoroquinolones including but not limited to GI upset, allergic reaction, drug rash, diarrhea, dizziness, photosensitivity, yeast infections, liver function test abnormalities, tendonitis/tendon rupture. Eucrisa Pregnancy And Lactation Text: This medication has not been assigned a Pregnancy Risk Category but animal studies failed to show danger with the topical medication. It is unknown if the medication is excreted in breast milk. Glycopyrrolate Pregnancy And Lactation Text: This medication is Pregnancy Category B and is considered safe during pregnancy. It is unknown if it is excreted breast milk. Thalidomide Pregnancy And Lactation Text: This medication is Pregnancy Category X and is absolutely contraindicated during pregnancy. It is unknown if it is excreted in breast milk. Terbinafine Pregnancy And Lactation Text: This medication is Pregnancy Category B and is considered safe during pregnancy. It is also excreted in breast milk and breast feeding isn't recommended. Cosentyx Pregnancy And Lactation Text: This medication is Pregnancy Category B and is considered safe during pregnancy. It is unknown if this medication is excreted in breast milk. Topical Sulfur Applications Counseling: Topical Sulfur Counseling: Patient counseled that this medication may cause skin irritation or allergic reactions.  In the event of skin irritation, the patient was advised to reduce the amount of the drug applied or use it less frequently.   The patient verbalized understanding of the proper use and possible adverse effects of topical sulfur application.  All of the patient's questions and concerns were addressed. Drysol Pregnancy And Lactation Text: This medication is considered safe during pregnancy and breast feeding. Skyrizi Pregnancy And Lactation Text: The risk during pregnancy and breastfeeding is uncertain with this medication. Cyclosporine Counseling:  I discussed with the patient the risks of cyclosporine including but not limited to hypertension, gingival hyperplasia,myelosuppression, immunosuppression, liver damage, kidney damage, neurotoxicity, lymphoma, and serious infections. The patient understands that monitoring is required including baseline blood pressure, CBC, CMP, lipid panel and uric acid, and then 1-2 times monthly CMP and blood pressure. Carac Counseling:  I discussed with the patient the risks of Carac including but not limited to erythema, scaling, itching, weeping, crusting, and pain. Hydroxychloroquine Counseling:  I discussed with the patient that a baseline ophthalmologic exam is needed at the start of therapy and every year thereafter while on therapy. A CBC may also be warranted for monitoring.  The side effects of this medication were discussed with the patient, including but not limited to agranulocytosis, aplastic anemia, seizures, rashes, retinopathy, and liver toxicity. Patient instructed to call the office should any adverse effect occur.  The patient verbalized understanding of the proper use and possible adverse effects of Plaquenil.  All the patient's questions and concerns were addressed. Elidel Counseling: Patient may experience a mild burning sensation during topical application. Elidel is not approved in children less than 2 years of age. There have been case reports of hematologic and skin malignancies in patients using topical calcineurin inhibitors although causality is questionable. Hydroquinone Counseling:  Patient advised that medication may result in skin irritation, lightening (hypopigmentation), dryness, and burning.  In the event of skin irritation, the patient was advised to reduce the amount of the drug applied or use it less frequently.  Rarely, spots that are treated with hydroquinone can become darker (pseudoochronosis).  Should this occur, patient instructed to stop medication and call the office. The patient verbalized understanding of the proper use and possible adverse effects of hydroquinone.  All of the patient's questions and concerns were addressed. Quinolones Pregnancy And Lactation Text: This medication is Pregnancy Category C and it isn't know if it is safe during pregnancy. It is also excreted in breast milk. Valtrex Counseling: I discussed with the patient the risks of valacyclovir including but not limited to kidney damage, nausea, vomiting and severe allergy.  The patient understands that if the infection seems to be worsening or is not improving, they are to call. Dupixent Counseling: I discussed with the patient the risks of dupilumab including but not limited to eye infection and irritation, cold sores, injection site reactions, worsening of asthma, allergic reactions and increased risk of parasitic infection.  Live vaccines should be avoided while taking dupilumab. Dupilumab will also interact with certain medications such as warfarin and cyclosporine. The patient understands that monitoring is required and they must alert us or the primary physician if symptoms of infection or other concerning signs are noted. Cyclosporine Pregnancy And Lactation Text: This medication is Pregnancy Category C and it isn't know if it is safe during pregnancy. This medication is excreted in breast milk. Valtrex Pregnancy And Lactation Text: this medication is Pregnancy Category B and is considered safe during pregnancy. This medication is not directly found in breast milk but it's metabolite acyclovir is present. Carac Pregnancy And Lactation Text: This medication is Pregnancy Category X and contraindicated in pregnancy and in women who may become pregnant. It is unknown if this medication is excreted in breast milk. Topical Sulfur Applications Pregnancy And Lactation Text: This medication is Pregnancy Category C and has an unknown safety profile during pregnancy. It is unknown if this topical medication is excreted in breast milk. Stelara Counseling:  I discussed with the patient the risks of ustekinumab including but not limited to immunosuppression, malignancy, posterior leukoencephalopathy syndrome, and serious infections.  The patient understands that monitoring is required including a PPD at baseline and must alert us or the primary physician if symptoms of infection or other concerning signs are noted. Azithromycin Counseling:  I discussed with the patient the risks of azithromycin including but not limited to GI upset, allergic reaction, drug rash, diarrhea, and yeast infections. Zyclara Counseling:  I discussed with the patient the risks of imiquimod including but not limited to erythema, scaling, itching, weeping, crusting, and pain.  Patient understands that the inflammatory response to imiquimod is variable from person to person and was educated regarded proper titration schedule.  If flu-like symptoms develop, patient knows to discontinue the medication and contact us. Cimetidine Counseling:  I discussed with the patient the risks of Cimetidine including but not limited to gynecomastia, headache, diarrhea, nausea, drowsiness, arrhythmias, pancreatitis, skin rashes, psychosis, bone marrow suppression and kidney toxicity. Elidel Pregnancy And Lactation Text: This medication is Pregnancy Category C. It is unknown if this medication is excreted in breast milk. Rifampin Counseling: I discussed with the patient the risks of rifampin including but not limited to liver damage, kidney damage, red-orange body fluids, nausea/vomiting and severe allergy. Hydroxychloroquine Pregnancy And Lactation Text: This medication has been shown to cause fetal harm but it isn't assigned a Pregnancy Risk Category. There are small amounts excreted in breast milk. Imiquimod Counseling:  I discussed with the patient the risks of imiquimod including but not limited to erythema, scaling, itching, weeping, crusting, and pain.  Patient understands that the inflammatory response to imiquimod is variable from person to person and was educated regarded proper titration schedule.  If flu-like symptoms develop, patient knows to discontinue the medication and contact us. Rifampin Pregnancy And Lactation Text: This medication is Pregnancy Category C and it isn't know if it is safe during pregnancy. It is also excreted in breast milk and should not be used if you are breast feeding. 5-Fu Counseling: 5-Fluorouracil Counseling:  I discussed with the patient the risks of 5-fluorouracil including but not limited to erythema, scaling, itching, weeping, crusting, and pain. Nsaids Counseling: NSAID Counseling: I discussed with the patient that NSAIDs should be taken with food. Prolonged use of NSAIDs can result in the development of stomach ulcers.  Patient advised to stop taking NSAIDs if abdominal pain occurs.  The patient verbalized understanding of the proper use and possible adverse effects of NSAIDs.  All of the patient's questions and concerns were addressed. Dupixent Pregnancy And Lactation Text: This medication likely crosses the placenta but the risk for the fetus is uncertain. This medication is excreted in breast milk. Methotrexate Counseling:  Patient counseled regarding adverse effects of methotrexate including but not limited to nausea, vomiting, abnormalities in liver function tests. Patients may develop mouth sores, rash, diarrhea, and abnormalities in blood counts. The patient understands that monitoring is required including LFT's and blood counts.  There is a rare possibility of scarring of the liver and lung problems that can occur when taking methotrexate. Persistent nausea, loss of appetite, pale stools, dark urine, cough, and shortness of breath should be reported immediately. Patient advised to discontinue methotrexate treatment at least three months before attempting to become pregnant.  I discussed the need for folate supplements while taking methotrexate.  These supplements can decrease side effects during methotrexate treatment. The patient verbalized understanding of the proper use and possible adverse effects of methotrexate.  All of the patient's questions and concerns were addressed. Enbrel Counseling:  I discussed with the patient the risks of etanercept including but not limited to myelosuppression, immunosuppression, autoimmune hepatitis, demyelinating diseases, lymphoma, and infections.  The patient understands that monitoring is required including a PPD at baseline and must alert us or the primary physician if symptoms of infection or other concerning signs are noted. Azithromycin Pregnancy And Lactation Text: This medication is considered safe during pregnancy and is also secreted in breast milk. Eucrisa Counseling: Patient may experience a mild burning sensation during topical application. Eucrisa is not approved in children less than 2 years of age. Taltz Counseling: I discussed with the patient the risks of ixekizumab including but not limited to immunosuppression, serious infections, worsening of inflammatory bowel disease and drug reactions.  The patient understands that monitoring is required including a PPD at baseline and must alert us or the primary physician if symptoms of infection or other concerning signs are noted. Use Enhanced Medication Counseling?: No Doxepin Counseling:  Patient advised that the medication is sedating and not to drive a car after taking this medication. Patient informed of potential adverse effects including but not limited to dry mouth, urinary retention, and blurry vision.  The patient verbalized understanding of the proper use and possible adverse effects of doxepin.  All of the patient's questions and concerns were addressed. Methotrexate Pregnancy And Lactation Text: This medication is Pregnancy Category X and is known to cause fetal harm. This medication is excreted in breast milk. Minoxidil Counseling: Minoxidil is a topical medication which can increase blood flow where it is applied. It is uncertain how this medication increases hair growth. Side effects are uncommon and include stinging and allergic reactions. Sarecycline Counseling: Patient advised regarding possible photosensitivity and discoloration of the teeth, skin, lips, tongue and gums.  Patient instructed to avoid sunlight, if possible.  When exposed to sunlight, patients should wear protective clothing, sunglasses, and sunscreen.  The patient was instructed to call the office immediately if the following severe adverse effects occur:  hearing changes, easy bruising/bleeding, severe headache, or vision changes.  The patient verbalized understanding of the proper use and possible adverse effects of sarecycline.  All of the patient's questions and concerns were addressed. Nsaids Pregnancy And Lactation Text: These medications are considered safe up to 30 weeks gestation. It is excreted in breast milk. Bactrim Counseling:  I discussed with the patient the risks of sulfa antibiotics including but not limited to GI upset, allergic reaction, drug rash, diarrhea, dizziness, photosensitivity, and yeast infections.  Rarely, more serious reactions can occur including but not limited to aplastic anemia, agranulocytosis, methemoglobinemia, blood dyscrasias, liver or kidney failure, lung infiltrates or desquamative/blistering drug rashes. Prednisone Counseling:  I discussed with the patient the risks of prolonged use of prednisone including but not limited to weight gain, insomnia, osteoporosis, mood changes, diabetes, susceptibility to infection, glaucoma and high blood pressure.  In cases where prednisone use is prolonged, patients should be monitored with blood pressure checks, serum glucose levels and an eye exam.  Additionally, the patient may need to be placed on GI prophylaxis, PCP prophylaxis, and calcium and vitamin D supplementation and/or a bisphosphonate.  The patient verbalized understanding of the proper use and the possible adverse effects of prednisone.  All of the patient's questions and concerns were addressed. Sarecycline Pregnancy And Lactation Text: This medication is Pregnancy Category D and not consider safe during pregnancy. It is also excreted in breast milk. Bactrim Pregnancy And Lactation Text: This medication is Pregnancy Category D and is known to cause fetal risk.  It is also excreted in breast milk. Arava Counseling:  Patient counseled regarding adverse effects of Arava including but not limited to nausea, vomiting, abnormalities in liver function tests. Patients may develop mouth sores, rash, diarrhea, and abnormalities in blood counts. The patient understands that monitoring is required including LFTs and blood counts.  There is a rare possibility of scarring of the liver and lung problems that can occur when taking methotrexate. Persistent nausea, loss of appetite, pale stools, dark urine, cough, and shortness of breath should be reported immediately. Patient advised to discontinue Arava treatment and consult with a physician prior to attempting conception. The patient will have to undergo a treatment to eliminate Arava from the body prior to conception. Tremfya Counseling: I discussed with the patient the risks of guselkumab including but not limited to immunosuppression, serious infections, worsening of inflammatory bowel disease and drug reactions.  The patient understands that monitoring is required including a PPD at baseline and must alert us or the primary physician if symptoms of infection or other concerning signs are noted. Doxepin Pregnancy And Lactation Text: This medication is Pregnancy Category C and it isn't known if it is safe during pregnancy. It is also excreted in breast milk and breast feeding isn't recommended. Hydroxyzine Counseling: Patient advised that the medication is sedating and not to drive a car after taking this medication.  Patient informed of potential adverse effects including but not limited to dry mouth, urinary retention, and blurry vision.  The patient verbalized understanding of the proper use and possible adverse effects of hydroxyzine.  All of the patient's questions and concerns were addressed. Odomzo Counseling- I discussed with the patient the risks of Odomzo including but not limited to nausea, vomiting, diarrhea, constipation, weight loss, changes in the sense of taste, decreased appetite, muscle spasms, and hair loss.  The patient verbalized understanding of the proper use and possible adverse effects of Odomzo.  All of the patient's questions and concerns were addressed. Humira Counseling:  I discussed with the patient the risks of adalimumab including but not limited to myelosuppression, immunosuppression, autoimmune hepatitis, demyelinating diseases, lymphoma, and serious infections.  The patient understands that monitoring is required including a PPD at baseline and must alert us or the primary physician if symptoms of infection or other concerning signs are noted. Tetracycline Counseling: Patient counseled regarding possible photosensitivity and increased risk for sunburn.  Patient instructed to avoid sunlight, if possible.  When exposed to sunlight, patients should wear protective clothing, sunglasses, and sunscreen.  The patient was instructed to call the office immediately if the following severe adverse effects occur:  hearing changes, easy bruising/bleeding, severe headache, or vision changes.  The patient verbalized understanding of the proper use and possible adverse effects of tetracycline.  All of the patient's questions and concerns were addressed. Patient understands to avoid pregnancy while on therapy due to potential birth defects. Acitretin Counseling:  I discussed with the patient the risks of acitretin including but not limited to hair loss, dry lips/skin/eyes, liver damage, hyperlipidemia, depression/suicidal ideation, photosensitivity.  Serious rare side effects can include but are not limited to pancreatitis, pseudotumor cerebri, bony changes, clot formation/stroke/heart attack.  Patient understands that alcohol is contraindicated since it can result in liver toxicity and significantly prolong the elimination of the drug by many years. Hydroxyzine Pregnancy And Lactation Text: This medication is not safe during pregnancy and should not be taken. It is also excreted in breast milk and breast feeding isn't recommended. Picato Counseling:  I discussed with the patient the risks of Picato including but not limited to erythema, scaling, itching, weeping, crusting, and pain. Xeljanz Counseling: I discussed with the patient the risks of Xeljanz therapy including increased risk of infection, liver issues, headache, diarrhea, or cold symptoms. Live vaccines should be avoided. They were instructed to call if they have any problems. Cephalexin Counseling: I counseled the patient regarding use of cephalexin as an antibiotic for prophylactic and/or therapeutic purposes. Cephalexin (commonly prescribed under brand name Keflex) is a cephalosporin antibiotic which is active against numerous classes of bacteria, including most skin bacteria. Side effects may include nausea, diarrhea, gastrointestinal upset, rash, hives, yeast infections, and in rare cases, hepatitis, kidney disease, seizures, fever, confusion, neurologic symptoms, and others. Patients with severe allergies to penicillin medications are cautioned that there is about a 10% incidence of cross-reactivity with cephalosporins. When possible, patients with penicillin allergies should use alternatives to cephalosporins for antibiotic therapy. Cephalexin Pregnancy And Lactation Text: This medication is Pregnancy Category B and considered safe during pregnancy.  It is also excreted in breast milk but can be used safely for shorter doses. Clofazimine Counseling:  I discussed with the patient the risks of clofazimine including but not limited to skin and eye pigmentation, liver damage, nausea/vomiting, gastrointestinal bleeding and allergy. Ilumya Counseling: I discussed with the patient the risks of tildrakizumab including but not limited to immunosuppression, malignancy, posterior leukoencephalopathy syndrome, and serious infections.  The patient understands that monitoring is required including a PPD at baseline and must alert us or the primary physician if symptoms of infection or other concerning signs are noted. Otezla Counseling: The side effects of Otezla were discussed with the patient, including but not limited to worsening or new depression, weight loss, diarrhea, nausea, upper respiratory tract infection, and headache. Patient instructed to call the office should any adverse effect occur.  The patient verbalized understanding of the proper use and possible adverse effects of Otezla.  All the patient's questions and concerns were addressed. Xelmehranz Pregnancy And Lactation Text: This medication is Pregnancy Category D and is not considered safe during pregnancy.  The risk during breast feeding is also uncertain. Clofazimine Pregnancy And Lactation Text: This medication is Pregnancy Category C and isn't considered safe during pregnancy. It is excreted in breast milk. Albendazole Counseling:  I discussed with the patient the risks of albendazole including but not limited to cytopenia, kidney damage, nausea/vomiting and severe allergy.  The patient understands that this medication is being used in an off-label manner. Clindamycin Counseling: I counseled the patient regarding use of clindamycin as an antibiotic for prophylactic and/or therapeutic purposes. Clindamycin is active against numerous classes of bacteria, including skin bacteria. Side effects may include nausea, diarrhea, gastrointestinal upset, rash, hives, yeast infections, and in rare cases, colitis. Otezla Pregnancy And Lactation Text: This medication is Pregnancy Category C and it isn't known if it is safe during pregnancy. It is unknown if it is excreted in breast milk. Acitretin Pregnancy And Lactation Text: This medication is Pregnancy Category X and should not be given to women who are pregnant or may become pregnant in the future. This medication is excreted in breast milk. Xolair Counseling:  Patient informed of potential adverse effects including but not limited to fever, muscle aches, rash and allergic reactions.  The patient verbalized understanding of the proper use and possible adverse effects of Xolair.  All of the patient's questions and concerns were addressed. Oxybutynin Counseling:  I discussed with the patient the risks of oxybutynin including but not limited to skin rash, drowsiness, dry mouth, difficulty urinating, and blurred vision. Fluconazole Counseling:  Patient counseled regarding adverse effects of fluconazole including but not limited to headache, diarrhea, nausea, upset stomach, liver function test abnormalities, taste disturbance, and stomach pain.  There is a rare possibility of liver failure that can occur when taking fluconazole.  The patient understands that monitoring of LFTs and kidney function test may be required, especially at baseline. The patient verbalized understanding of the proper use and possible adverse effects of fluconazole.  All of the patient's questions and concerns were addressed. Detail Level: Zone Protopic Counseling: Patient may experience a mild burning sensation during topical application. Protopic is not approved in children less than 2 years of age. There have been case reports of hematologic and skin malignancies in patients using topical calcineurin inhibitors although causality is questionable. Infliximab Counseling:  I discussed with the patient the risks of infliximab including but not limited to myelosuppression, immunosuppression, autoimmune hepatitis, demyelinating diseases, lymphoma, and serious infections.  The patient understands that monitoring is required including a PPD at baseline and must alert us or the primary physician if symptoms of infection or other concerning signs are noted. Protopic Pregnancy And Lactation Text: This medication is Pregnancy Category C. It is unknown if this medication is excreted in breast milk when applied topically. Bexarotene Counseling:  I discussed with the patient the risks of bexarotene including but not limited to hair loss, dry lips/skin/eyes, liver abnormalities, hyperlipidemia, pancreatitis, depression/suicidal ideation, photosensitivity, drug rash/allergic reactions, hypothyroidism, anemia, leukopenia, infection, cataracts, and teratogenicity.  Patient understands that they will need regular blood tests to check lipid profile, liver function tests, white blood cell count, thyroid function tests and pregnancy test if applicable. Albendazole Pregnancy And Lactation Text: This medication is Pregnancy Category C and it isn't known if it is safe during pregnancy. It is also excreted in breast milk. Clindamycin Pregnancy And Lactation Text: This medication can be used in pregnancy if certain situations. Clindamycin is also present in breast milk. Colchicine Counseling:  Patient counseled regarding adverse effects including but not limited to stomach upset (nausea, vomiting, stomach pain, or diarrhea).  Patient instructed to limit alcohol consumption while taking this medication.  Colchicine may reduce blood counts especially with prolonged use.  The patient understands that monitoring of kidney function and blood counts may be required, especially at baseline. The patient verbalized understanding of the proper use and possible adverse effects of colchicine.  All of the patient's questions and concerns were addressed. Doxycycline Counseling:  Patient counseled regarding possible photosensitivity and increased risk for sunburn.  Patient instructed to avoid sunlight, if possible.  When exposed to sunlight, patients should wear protective clothing, sunglasses, and sunscreen.  The patient was instructed to call the office immediately if the following severe adverse effects occur:  hearing changes, easy bruising/bleeding, severe headache, or vision changes.  The patient verbalized understanding of the proper use and possible adverse effects of doxycycline.  All of the patient's questions and concerns were addressed. Oxybutynin Pregnancy And Lactation Text: This medication is Pregnancy Category B and is considered safe during pregnancy. It is unknown if it is excreted in breast milk. Ivermectin Counseling:  Patient instructed to take medication on an empty stomach with a full glass of water.  Patient informed of potential adverse effects including but not limited to nausea, diarrhea, dizziness, itching, and swelling of the extremities or lymph nodes.  The patient verbalized understanding of the proper use and possible adverse effects of ivermectin.  All of the patient's questions and concerns were addressed. Griseofulvin Counseling:  I discussed with the patient the risks of griseofulvin including but not limited to photosensitivity, cytopenia, liver damage, nausea/vomiting and severe allergy.  The patient understands that this medication is best absorbed when taken with a fatty meal (e.g., ice cream or french fries). Solaraze Counseling:  I discussed with the patient the risks of Solaraze including but not limited to erythema, scaling, itching, weeping, crusting, and pain. Birth Control Pills Counseling: Birth Control Pill Counseling: I discussed with the patient the potential side effects of OCPs including but not limited to increased risk of stroke, heart attack, thrombophlebitis, deep venous thrombosis, hepatic adenomas, breast changes, GI upset, headaches, and depression.  The patient verbalized understanding of the proper use and possible adverse effects of OCPs. All of the patient's questions and concerns were addressed. Xolair Pregnancy And Lactation Text: This medication is Pregnancy Category B and is considered safe during pregnancy. This medication is excreted in breast milk. Bexarotene Pregnancy And Lactation Text: This medication is Pregnancy Category X and should not be given to women who are pregnant or may become pregnant. This medication should not be used if you are breast feeding. Rituxan Counseling:  I discussed with the patient the risks of Rituxan infusions. Side effects can include infusion reactions, severe drug rashes including mucocutaneous reactions, reactivation of latent hepatitis and other infections and rarely progressive multifocal leukoencephalopathy.  All of the patient's questions and concerns were addressed. Dapsone Counseling: I discussed with the patient the risks of dapsone including but not limited to hemolytic anemia, agranulocytosis, rashes, methemoglobinemia, kidney failure, peripheral neuropathy, headaches, GI upset, and liver toxicity.  Patients who start dapsone require monitoring including baseline LFTs and weekly CBCs for the first month, then every month thereafter.  The patient verbalized understanding of the proper use and possible adverse effects of dapsone.  All of the patient's questions and concerns were addressed. Doxycycline Pregnancy And Lactation Text: This medication is Pregnancy Category D and not consider safe during pregnancy. It is also excreted in breast milk but is considered safe for shorter treatment courses. Birth Control Pills Pregnancy And Lactation Text: This medication should be avoided if pregnant and for the first 30 days post-partum. Isotretinoin Counseling: Patient should get monthly blood tests, not donate blood, not drive at night if vision affected, not share medication, and not undergo elective surgery for 6 months after tx completed. Side effects reviewed, pt to contact office should one occur. Solaraze Pregnancy And Lactation Text: This medication is Pregnancy Category B and is considered safe. There is some data to suggest avoiding during the third trimester. It is unknown if this medication is excreted in breast milk. Topical Retinoid counseling:  Patient advised to apply a pea-sized amount only at bedtime and wait 30 minutes after washing their face before applying.  If too drying, patient may add a non-comedogenic moisturizer. The patient verbalized understanding of the proper use and possible adverse effects of retinoids.  All of the patient's questions and concerns were addressed. Rituxan Pregnancy And Lactation Text: This medication is Pregnancy Category C and it isn't know if it is safe during pregnancy. It is unknown if this medication is excreted in breast milk but similar antibodies are known to be excreted. Griseofulvin Pregnancy And Lactation Text: This medication is Pregnancy Category X and is known to cause serious birth defects. It is unknown if this medication is excreted in breast milk but breast feeding should be avoided. High Dose Vitamin A Counseling: Side effects reviewed, pt to contact office should one occur. Erythromycin Counseling:  I discussed with the patient the risks of erythromycin including but not limited to GI upset, allergic reaction, drug rash, diarrhea, increase in liver enzymes, and yeast infections. Dapsone Pregnancy And Lactation Text: This medication is Pregnancy Category C and is not considered safe during pregnancy or breast feeding. Azathioprine Counseling:  I discussed with the patient the risks of azathioprine including but not limited to myelosuppression, immunosuppression, hepatotoxicity, lymphoma, and infections.  The patient understands that monitoring is required including baseline LFTs, Creatinine, possible TPMP genotyping and weekly CBCs for the first month and then every 2 weeks thereafter.  The patient verbalized understanding of the proper use and possible adverse effects of azathioprine.  All of the patient's questions and concerns were addressed. Itraconazole Counseling:  I discussed with the patient the risks of itraconazole including but not limited to liver damage, nausea/vomiting, neuropathy, and severe allergy.  The patient understands that this medication is best absorbed when taken with acidic beverages such as non-diet cola or ginger ale.  The patient understands that monitoring is required including baseline LFTs and repeat LFTs at intervals.  The patient understands that they are to contact us or the primary physician if concerning signs are noted. Isotretinoin Pregnancy And Lactation Text: This medication is Pregnancy Category X and is considered extremely dangerous during pregnancy. It is unknown if it is excreted in breast milk. Erivedge Counseling- I discussed with the patient the risks of Erivedge including but not limited to nausea, vomiting, diarrhea, constipation, weight loss, changes in the sense of taste, decreased appetite, muscle spasms, and hair loss.  The patient verbalized understanding of the proper use and possible adverse effects of Erivedge.  All of the patient's questions and concerns were addressed. Erythromycin Pregnancy And Lactation Text: This medication is Pregnancy Category B and is considered safe during pregnancy. It is also excreted in breast milk. High Dose Vitamin A Pregnancy And Lactation Text: High dose vitamin A therapy is contraindicated during pregnancy and breast feeding. Spironolactone Counseling: Patient advised regarding risks of diarrhea, abdominal pain, hyperkalemia, birth defects (for female patients), liver toxicity and renal toxicity. The patient may need blood work to monitor liver and kidney function and potassium levels while on therapy. The patient verbalized understanding of the proper use and possible adverse effects of spironolactone.  All of the patient's questions and concerns were addressed. Siliq Counseling:  I discussed with the patient the risks of Siliq including but not limited to new or worsening depression, suicidal thoughts and behavior, immunosuppression, malignancy, posterior leukoencephalopathy syndrome, and serious infections.  The patient understands that monitoring is required including a PPD at baseline and must alert us or the primary physician if symptoms of infection or other concerning signs are noted. There is also a special program designed to monitor depression which is required with Siliq. Spironolactone Pregnancy And Lactation Text: This medication can cause feminization of the male fetus and should be avoided during pregnancy. The active metabolite is also found in breast milk. Azathioprine Pregnancy And Lactation Text: This medication is Pregnancy Category D and isn't considered safe during pregnancy. It is unknown if this medication is excreted in breast milk. Tazorac Counseling:  Patient advised that medication is irritating and drying.  Patient may need to apply sparingly and wash off after an hour before eventually leaving it on overnight.  The patient verbalized understanding of the proper use and possible adverse effects of tazorac.  All of the patient's questions and concerns were addressed. Metronidazole Counseling:  I discussed with the patient the risks of metronidazole including but not limited to seizures, nausea/vomiting, a metallic taste in the mouth, nausea/vomiting and severe allergy. Cellcept Counseling:  I discussed with the patient the risks of mycophenolate mofetil including but not limited to infection/immunosuppression, GI upset, hypokalemia, hypercholesterolemia, bone marrow suppression, lymphoproliferative disorders, malignancy, GI ulceration/bleed/perforation, colitis, interstitial lung disease, kidney failure, progressive multifocal leukoencephalopathy, and birth defects.  The patient understands that monitoring is required including a baseline creatinine and regular CBC testing. In addition, patient must alert us immediately if symptoms of infection or other concerning signs are noted. Metronidazole Pregnancy And Lactation Text: This medication is Pregnancy Category B and considered safe during pregnancy.  It is also excreted in breast milk. Simponi Counseling:  I discussed with the patient the risks of golimumab including but not limited to myelosuppression, immunosuppression, autoimmune hepatitis, demyelinating diseases, lymphoma, and serious infections.  The patient understands that monitoring is required including a PPD at baseline and must alert us or the primary physician if symptoms of infection or other concerning signs are noted. SSKI Counseling:  I discussed with the patient the risks of SSKI including but not limited to thyroid abnormalities, metallic taste, GI upset, fever, headache, acne, arthralgias, paraesthesias, lymphadenopathy, easy bleeding, arrhythmias, and allergic reaction. Ketoconazole Counseling:   Patient counseled regarding improving absorption with orange juice.  Adverse effects include but are not limited to breast enlargement, headache, diarrhea, nausea, upset stomach, liver function test abnormalities, taste disturbance, and stomach pain.  There is a rare possibility of liver failure that can occur when taking ketoconazole. The patient understands that monitoring of LFTs may be required, especially at baseline. The patient verbalized understanding of the proper use and possible adverse effects of ketoconazole.  All of the patient's questions and concerns were addressed. Tazorac Pregnancy And Lactation Text: This medication is not safe during pregnancy. It is unknown if this medication is excreted in breast milk. Ketoconazole Pregnancy And Lactation Text: This medication is Pregnancy Category C and it isn't know if it is safe during pregnancy. It is also excreted in breast milk and breast feeding isn't recommended. Cyclophosphamide Counseling:  I discussed with the patient the risks of cyclophosphamide including but not limited to hair loss, hormonal abnormalities, decreased fertility, abdominal pain, diarrhea, nausea and vomiting, bone marrow suppression and infection. The patient understands that monitoring is required while taking this medication. Gabapentin Counseling: I discussed with the patient the risks of gabapentin including but not limited to dizziness, somnolence, fatigue and ataxia. Cimzia Counseling:  I discussed with the patient the risks of Cimzia including but not limited to immunosuppression, allergic reactions and infections.  The patient understands that monitoring is required including a PPD at baseline and must alert us or the primary physician if symptoms of infection or other concerning signs are noted. Cimzia Pregnancy And Lactation Text: This medication crosses the placenta but can be considered safe in certain situations. Cimzia may be excreted in breast milk. Minocycline Counseling: Patient advised regarding possible photosensitivity and discoloration of the teeth, skin, lips, tongue and gums.  Patient instructed to avoid sunlight, if possible.  When exposed to sunlight, patients should wear protective clothing, sunglasses, and sunscreen.  The patient was instructed to call the office immediately if the following severe adverse effects occur:  hearing changes, easy bruising/bleeding, severe headache, or vision changes.  The patient verbalized understanding of the proper use and possible adverse effects of minocycline.  All of the patient's questions and concerns were addressed. Benzoyl Peroxide Counseling: Patient counseled that medicine may cause skin irritation and bleach clothing.  In the event of skin irritation, the patient was advised to reduce the amount of the drug applied or use it less frequently.   The patient verbalized understanding of the proper use and possible adverse effects of benzoyl peroxide.  All of the patient's questions and concerns were addressed. Sski Pregnancy And Lactation Text: This medication is Pregnancy Category D and isn't considered safe during pregnancy. It is excreted in breast milk. Cyclophosphamide Pregnancy And Lactation Text: This medication is Pregnancy Category D and it isn't considered safe during pregnancy. This medication is excreted in breast milk. Terbinafine Counseling: Patient counseling regarding adverse effects of terbinafine including but not limited to headache, diarrhea, rash, upset stomach, liver function test abnormalities, itching, taste/smell disturbance, nausea, abdominal pain, and flatulence.  There is a rare possibility of liver failure that can occur when taking terbinafine.  The patient understands that a baseline LFT and kidney function test may be required. The patient verbalized understanding of the proper use and possible adverse effects of terbinafine.  All of the patient's questions and concerns were addressed. Thalidomide Counseling: I discussed with the patient the risks of thalidomide including but not limited to birth defects, anxiety, weakness, chest pain, dizziness, cough and severe allergy. Topical Clindamycin Counseling: Patient counseled that this medication may cause skin irritation or allergic reactions.  In the event of skin irritation, the patient was advised to reduce the amount of the drug applied or use it less frequently.   The patient verbalized understanding of the proper use and possible adverse effects of clindamycin.  All of the patient's questions and concerns were addressed. Drysol Counseling:  I discussed with the patient the risks of drysol/aluminum chloride including but not limited to skin rash, itching, irritation, burning. Skyrizi Counseling: I discussed with the patient the risks of risankizumab-rzaa including but not limited to immunosuppression, and serious infections.  The patient understands that monitoring is required including a PPD at baseline and must alert us or the primary physician if symptoms of infection or other concerning signs are noted. Cosentyx Counseling:  I discussed with the patient the risks of Cosentyx including but not limited to worsening of Crohn's disease, immunosuppression, allergic reactions and infections.  The patient understands that monitoring is required including a PPD at baseline and must alert us or the primary physician if symptoms of infection or other concerning signs are noted. Benzoyl Peroxide Pregnancy And Lactation Text: This medication is Pregnancy Category C. It is unknown if benzoyl peroxide is excreted in breast milk. Glycopyrrolate Counseling:  I discussed with the patient the risks of glycopyrrolate including but not limited to skin rash, drowsiness, dry mouth, difficulty urinating, and blurred vision.

## 2022-09-29 NOTE — ED PROVIDER NOTE - SKIN, MLM
Pt aware
Pt called and said he needs and refill on fluocinonide 0 05 
Sent   Peewee Davidson, DO
Skin normal color for race, warm, dry and intact. No evidence of rash.

## 2023-10-30 NOTE — DISCHARGE NOTE ADULT - CARE PROVIDER_API CALL
Refill request is denied because it is early.  He filled a prescription for 84 days on September 29, 2023   René Mondragon (DO), Internal Medicine  [address] [place] [address]  Phone: [phone]  Fax: [phone]

## 2024-02-07 NOTE — PROGRESS NOTE ADULT - PROBLEM/PLAN-3
DISPLAY PLAN FREE TEXT
8:30AM/today

## 2024-06-12 NOTE — PATIENT PROFILE ADULT. - NS PRO REGISTERED ORGAN DONOR
Reason for Call:  Appointment Request    Patient requesting this type of appt:  office    Requested provider: Olegario Castillo    Reason patient unable to be scheduled: Not within requested timeframe    When does patient want to be seen/preferred time:  ASAP    Comments: na    Could we send this information to you in [facility] or would you prefer to receive a phone call?:   Patient would prefer a phone call   Okay to leave a detailed message?: Yes at Other phone number:  [phone]    Call taken on 6/12/2024 at 4:39 PM by Bailey Palomares     No Yes

## 2025-04-15 NOTE — PROGRESS NOTE ADULT - PROBLEM SELECTOR PLAN 2
Writer spoke with pt and discussed results in detail. Pt agreeable to beginning Lantus 15 units in the evening at bedtime and checking blood sugars 3 times daily before meals. Pt agreeable to log blood sugars and bringing them to next appointment 4/21/25. Pt will  insulin pen this afternoon and meet with RN as well for Insulin administration for teaching and possibly glucometer teaching so that pt feels comfortable.    Writer and pt discussed the CA 19-9 antigen being high and that Dr. Guerin recommends doing all of pt cancer screenings ASAP. Pt is in agreement and will discuss next week with Dr. Guerin. Pt stated that pt figured something was going on.   Pt stated that sleep is very disrupted and is up often during the night urinating every 1.5 hrs or so.     Pt is also taking aspirin 81 mg purchased OTC, has 2 heart stents, writer added to med list.    c/w Solumedrol  advair/spiriva restarted